# Patient Record
Sex: MALE | Race: WHITE | NOT HISPANIC OR LATINO | Employment: FULL TIME | URBAN - METROPOLITAN AREA
[De-identification: names, ages, dates, MRNs, and addresses within clinical notes are randomized per-mention and may not be internally consistent; named-entity substitution may affect disease eponyms.]

---

## 2017-01-10 ENCOUNTER — OFFICE VISIT (OUTPATIENT)
Dept: SPORTS MEDICINE | Facility: CLINIC | Age: 57
End: 2017-01-10

## 2017-01-10 VITALS
WEIGHT: 183 LBS | DIASTOLIC BLOOD PRESSURE: 90 MMHG | HEIGHT: 68 IN | TEMPERATURE: 98.5 F | BODY MASS INDEX: 27.74 KG/M2 | SYSTOLIC BLOOD PRESSURE: 130 MMHG | OXYGEN SATURATION: 97 % | HEART RATE: 68 BPM

## 2017-01-10 DIAGNOSIS — Z00.00 PREVENTATIVE HEALTH CARE: Primary | ICD-10-CM

## 2017-01-10 DIAGNOSIS — Z11.59 NEED FOR HEPATITIS C SCREENING TEST: ICD-10-CM

## 2017-01-10 DIAGNOSIS — I10 ESSENTIAL HYPERTENSION: ICD-10-CM

## 2017-01-10 PROCEDURE — 99396 PREV VISIT EST AGE 40-64: CPT | Performed by: FAMILY MEDICINE

## 2017-01-10 RX ORDER — IRBESARTAN 150 MG/1
150 TABLET ORAL DAILY
Qty: 90 TABLET | Refills: 3 | Status: SHIPPED | OUTPATIENT
Start: 2017-01-10 | End: 2018-01-10 | Stop reason: SDUPTHER

## 2017-01-10 RX ORDER — VARDENAFIL HCL 10 MG
TABLET,DISINTEGRATING ORAL
Qty: 10 TABLET | Refills: 1 | Status: SHIPPED | OUTPATIENT
Start: 2017-01-10 | End: 2019-02-20 | Stop reason: ALTCHOICE

## 2017-01-10 NOTE — MR AVS SNAPSHOT
Tacho Armendariz   1/10/2017 8:00 AM   Office Visit    Dept Phone:  [phone]   Encounter #:  [id number]    Provider:  Bigg Phillips MD   Department:  [facility]                Your Full Care Plan              Today's Medication Changes          These changes are accurate as of: 1/10/17  9:38 AM.  If you have any questions, ask your nurse or doctor.               New Medication(s)Ordered:     irbesartan 150 MG tablet   Commonly known as:  AVAPRO   Take 1 tablet by mouth Daily.   Started by:  Bigg Phillips MD            Where to Get Your Medications      These medications were sent to [place] [id number] [place] [address]  - [phone] [facility] [phone] [address] [address]     Phone:  [phone]     irbesartan 150 MG tablet                  Your Updated Medication List          This list is accurate as of: 1/10/17  9:38 AM.  Always use your most recent med list.                irbesartan 150 MG tablet   Commonly known as:  AVAPRO   Take 1 tablet by mouth Daily.       STAXYN 10 MG tablet dispersible   Generic drug:  Vardenafil HCl       sulfamethoxazole-trimethoprim 800-160 MG per tablet   Commonly known as:  BACTRIM DS,SEPTRA DS   One tablet po BID until completed for infection               We Performed the Following     CBC & Differential     Comprehensive Metabolic Panel     Hepatitis C Antibody     Lipid Panel With / Chol / HDL Ratio     PSA     T4, Free     TSH     UA / M With / Rflx Culture([facility] ONLY)       You Were Diagnosed With        Codes Comments    Preventative health care    -  Primary ICD-10-CM: Z00.00  ICD-9-CM: V70.0     Essential hypertension     ICD-10-CM: I10  ICD-9-CM: 401.9     Need for hepatitis C screening test     ICD-10-CM: Z11.59  ICD-9-CM: V73.89       Instructions     None    Patient Instructions History      Upcoming Appointments     Visit Type  "Date Time Department    PHYSICAL 1/10/2017  8:00 AM [facility]      "[employer] Signup     [facility] "[employer] allows you to send messages to your doctor, view your test results, renew your prescriptions, schedule appointments, and more. To sign up, go to [employer] and click on the Sign Up Now link in the New User? box. Enter your "[employer] Activation Code exactly as it appears below along with the last four digits of your Social Security Number and your Date of Birth () to complete the sign-up process. If you do not sign up before the expiration date, you must request a new code.    "[employer] Activation Code: [email or web address]  Expires: 2017  5:34 AM    If you have questions, you can email [employer] [email or web address] or call [phone] to talk to our "[employer] staff. Remember, "[employer] is NOT to be used for urgent needs. For medical emergencies, dial 911.               Other Info from Your Visit           Allergies     No Known Allergies      Reason for Visit     Annual Exam           Vital Signs     Blood Pressure Pulse Temperature Height Weight Oxygen Saturation    130/90 (BP Location: Right arm, Patient Position: Sitting, Cuff Size: Adult) 68 98.5 °F (36.9 °C) (Oral) 68\" (172.7 cm) 183 lb (83 kg) 97%    Body Mass Index Smoking Status                27.83 kg/m2 Never Smoker          Problems and Diagnoses Noted     High blood pressure    Preventative health care    -  Primary    Need for hepatitis C screening test            "

## 2017-01-10 NOTE — PROGRESS NOTES
"Subjective   Tacho Armendariz is a 56 y.o. male.   Chief Complaint   Patient presents with   • Annual Exam       History of Present Illness   1.  CPE  2.  Plans cscope this summer  3.  Had not refilled BP meds due to \"not sure I needed it\", has been out for over a month    I have reviewed the patient's medical history in detail and updated the computerized patient record.        Review of Systems   Constitutional: Negative for activity change, appetite change, chills, diaphoresis, fatigue, fever and unexpected weight change.   HENT: Negative for congestion, ear pain, postnasal drip, rhinorrhea, sinus pressure, sneezing, sore throat and trouble swallowing.    Eyes: Negative for visual disturbance.   Respiratory: Negative for cough, chest tightness, shortness of breath and wheezing.    Cardiovascular: Negative for chest pain and palpitations.   Gastrointestinal: Negative for abdominal pain, blood in stool, nausea and vomiting.   Endocrine: Negative for cold intolerance, polydipsia, polyphagia and polyuria.   Genitourinary: Negative for dysuria, flank pain, frequency, hematuria and urgency.   Musculoskeletal: Negative for arthralgias, back pain, joint swelling and myalgias.   Skin: Negative for rash.   Allergic/Immunologic: Negative for environmental allergies.   Neurological: Negative for dizziness, syncope, weakness, numbness and headaches.   Hematological: Negative for adenopathy. Does not bruise/bleed easily.   Psychiatric/Behavioral: Negative for agitation, decreased concentration, dysphoric mood, sleep disturbance and suicidal ideas. The patient is not nervous/anxious.      Visit Vitals   • /90 (BP Location: Right arm, Patient Position: Sitting, Cuff Size: Adult)   • Pulse 68   • Temp 98.5 °F (36.9 °C) (Oral)   • Ht 68\" (172.7 cm)   • Wt 183 lb (83 kg)   • SpO2 97%   • BMI 27.83 kg/m2       Objective   Physical Exam   Constitutional: He is oriented to person, place, and time. He appears well-developed and " well-nourished.   HENT:   Head: Normocephalic and atraumatic.   Right Ear: External ear normal.   Left Ear: External ear normal.   Nose: Nose normal.   Mouth/Throat: Oropharynx is clear and moist.   Eyes: Conjunctivae and EOM are normal. Pupils are equal, round, and reactive to light.   Neck: Normal range of motion. Neck supple. No thyromegaly present.   Cardiovascular: Normal rate, regular rhythm and normal heart sounds.    No peripheral edema   Pulmonary/Chest: Effort normal and breath sounds normal.   Abdominal: Soft. Bowel sounds are normal.   Neurological: He is alert and oriented to person, place, and time.   Skin: Skin is warm, dry and intact.   Psychiatric: He has a normal mood and affect. His behavior is normal. Thought content normal.   Vitals reviewed.      Assessment/Plan   Tacho was seen today for annual exam.    Diagnoses and all orders for this visit:    Preventative health care  -     CBC & Differential  -     Comprehensive Metabolic Panel  -     Lipid Panel With / Chol / HDL Ratio  -     PSA  -     T4, Free  -     TSH  -     UA / M With / Rflx Culture([facility] ONLY)    Essential hypertension  -     irbesartan (AVAPRO) 150 MG tablet; Take 1 tablet by mouth Daily.    Need for hepatitis C screening test  -     Hepatitis C Antibody          Restart Avapro, FU 1 month  Patient will make own appt for cscope

## 2017-01-11 LAB
ALBUMIN SERPL-MCNC: 4.6 G/DL (ref 3.5–5.2)
ALBUMIN/GLOB SERPL: 1.7 G/DL
ALP SERPL-CCNC: 54 U/L (ref 39–117)
ALT SERPL-CCNC: 21 U/L (ref 1–41)
APPEARANCE UR: CLEAR
AST SERPL-CCNC: 19 U/L (ref 1–40)
BACTERIA #/AREA URNS HPF: ABNORMAL /HPF
BASOPHILS # BLD AUTO: 0.04 10*3/MM3 (ref 0–0.2)
BASOPHILS NFR BLD AUTO: 0.6 % (ref 0–1.5)
BILIRUB SERPL-MCNC: 0.5 MG/DL (ref 0.1–1.2)
BILIRUB UR QL STRIP: NEGATIVE
BUN SERPL-MCNC: 15 MG/DL (ref 6–20)
BUN/CREAT SERPL: 13.5 (ref 7–25)
CALCIUM SERPL-MCNC: 9.7 MG/DL (ref 8.6–10.5)
CASTS URNS MICRO: ABNORMAL
CASTS URNS QL MICRO: PRESENT /LPF
CHLORIDE SERPL-SCNC: 103 MMOL/L (ref 98–107)
CHOLEST SERPL-MCNC: 201 MG/DL (ref 0–200)
CHOLEST/HDLC SERPL: 3.47 {RATIO}
CO2 SERPL-SCNC: 27.4 MMOL/L (ref 22–29)
COLOR UR: YELLOW
CREAT SERPL-MCNC: 1.11 MG/DL (ref 0.76–1.27)
CRYSTALS URNS MICRO: ABNORMAL
EOSINOPHIL # BLD AUTO: 0.2 10*3/MM3 (ref 0–0.7)
EOSINOPHIL NFR BLD AUTO: 2.9 % (ref 0.3–6.2)
EPI CELLS #/AREA URNS HPF: ABNORMAL /HPF
ERYTHROCYTE [DISTWIDTH] IN BLOOD BY AUTOMATED COUNT: 13.2 % (ref 11.5–14.5)
GLOBULIN SER CALC-MCNC: 2.7 GM/DL
GLUCOSE SERPL-MCNC: 88 MG/DL (ref 65–99)
GLUCOSE UR QL: NEGATIVE
HCT VFR BLD AUTO: 47.6 % (ref 40.4–52.2)
HCV AB S/CO SERPL IA: 0.1 S/CO RATIO (ref 0–0.9)
HDLC SERPL-MCNC: 58 MG/DL (ref 40–60)
HGB BLD-MCNC: 16 G/DL (ref 13.7–17.6)
HGB UR QL STRIP: NEGATIVE
IMM GRANULOCYTES # BLD: 0.02 10*3/MM3 (ref 0–0.03)
IMM GRANULOCYTES NFR BLD: 0.3 % (ref 0–0.5)
KETONES UR QL STRIP: NEGATIVE
LDLC SERPL CALC-MCNC: 121 MG/DL (ref 0–100)
LEUKOCYTE ESTERASE UR QL STRIP: NEGATIVE
LYMPHOCYTES # BLD AUTO: 2.32 10*3/MM3 (ref 0.9–4.8)
LYMPHOCYTES NFR BLD AUTO: 33.5 % (ref 19.6–45.3)
MCH RBC QN AUTO: 32.3 PG (ref 27–32.7)
MCHC RBC AUTO-ENTMCNC: 33.6 G/DL (ref 32.6–36.4)
MCV RBC AUTO: 96 FL (ref 79.8–96.2)
MICRO URNS: NORMAL
MICRO URNS: NORMAL
MONOCYTES # BLD AUTO: 0.54 10*3/MM3 (ref 0.2–1.2)
MONOCYTES NFR BLD AUTO: 7.8 % (ref 5–12)
MUCOUS THREADS URNS QL MICRO: PRESENT /HPF
NEUTROPHILS # BLD AUTO: 3.81 10*3/MM3 (ref 1.9–8.1)
NEUTROPHILS NFR BLD AUTO: 54.9 % (ref 42.7–76)
NITRITE UR QL STRIP: NEGATIVE
PH UR STRIP: 5.5 [PH] (ref 5–7.5)
PLATELET # BLD AUTO: 267 10*3/MM3 (ref 140–500)
POTASSIUM SERPL-SCNC: 4.5 MMOL/L (ref 3.5–5.2)
PROT SERPL-MCNC: 7.3 G/DL (ref 6–8.5)
PROT UR QL STRIP: NEGATIVE
PSA SERPL-MCNC: 3.73 NG/ML (ref 0–4)
RBC # BLD AUTO: 4.96 10*6/MM3 (ref 4.6–6)
RBC #/AREA URNS HPF: ABNORMAL /HPF
SODIUM SERPL-SCNC: 142 MMOL/L (ref 136–145)
SP GR UR: 1.02 (ref 1–1.03)
T4 FREE SERPL-MCNC: 1.38 NG/DL (ref 0.93–1.7)
TRIGL SERPL-MCNC: 112 MG/DL (ref 0–150)
TSH SERPL DL<=0.005 MIU/L-ACNC: 4.86 MIU/ML (ref 0.27–4.2)
UNIDENT CRYS URNS QL MICRO: PRESENT /LPF
URINALYSIS REFLEX: NORMAL
UROBILINOGEN UR STRIP-MCNC: 0.2 MG/DL (ref 0.2–1)
VLDLC SERPL CALC-MCNC: 22.4 MG/DL (ref 5–40)
WBC # BLD AUTO: 6.93 10*3/MM3 (ref 4.5–10.7)
WBC #/AREA URNS HPF: ABNORMAL /HPF

## 2018-01-10 DIAGNOSIS — I10 ESSENTIAL HYPERTENSION: ICD-10-CM

## 2018-01-10 RX ORDER — IRBESARTAN 150 MG/1
150 TABLET ORAL DAILY
Qty: 90 TABLET | Refills: 0 | Status: SHIPPED | OUTPATIENT
Start: 2018-01-10 | End: 2018-02-19 | Stop reason: SDUPTHER

## 2018-02-12 ENCOUNTER — LAB (OUTPATIENT)
Dept: SPORTS MEDICINE | Facility: CLINIC | Age: 58
End: 2018-02-12

## 2018-02-12 DIAGNOSIS — Z00.00 PREVENTATIVE HEALTH CARE: ICD-10-CM

## 2018-02-12 DIAGNOSIS — Z00.00 PREVENTATIVE HEALTH CARE: Primary | ICD-10-CM

## 2018-02-13 LAB
ABO GROUP BLD: NORMAL
ALBUMIN SERPL-MCNC: 4.6 G/DL (ref 3.5–5.2)
ALBUMIN/GLOB SERPL: 1.5 G/DL
ALP SERPL-CCNC: 53 U/L (ref 39–117)
ALT SERPL-CCNC: 25 U/L (ref 1–41)
APPEARANCE UR: ABNORMAL
AST SERPL-CCNC: 49 U/L (ref 1–40)
BACTERIA #/AREA URNS HPF: ABNORMAL /HPF
BASOPHILS # BLD AUTO: 0.03 10*3/MM3 (ref 0–0.2)
BASOPHILS NFR BLD AUTO: 0.4 % (ref 0–1.5)
BILIRUB SERPL-MCNC: 0.6 MG/DL (ref 0.1–1.2)
BILIRUB UR QL STRIP: NEGATIVE
BUN SERPL-MCNC: 17 MG/DL (ref 6–20)
BUN/CREAT SERPL: 15.6 (ref 7–25)
CALCIUM SERPL-MCNC: 9.6 MG/DL (ref 8.6–10.5)
CHLORIDE SERPL-SCNC: 101 MMOL/L (ref 98–107)
CHOLEST SERPL-MCNC: 226 MG/DL (ref 0–200)
CHOLEST/HDLC SERPL: 3.48 {RATIO}
CO2 SERPL-SCNC: 27.6 MMOL/L (ref 22–29)
COLOR UR: YELLOW
CREAT SERPL-MCNC: 1.09 MG/DL (ref 0.76–1.27)
CRYSTALS URNS MICRO: ABNORMAL
EOSINOPHIL # BLD AUTO: 0.12 10*3/MM3 (ref 0–0.7)
EOSINOPHIL NFR BLD AUTO: 1.7 % (ref 0.3–6.2)
EPI CELLS #/AREA URNS HPF: ABNORMAL /HPF
ERYTHROCYTE [DISTWIDTH] IN BLOOD BY AUTOMATED COUNT: 13.2 % (ref 11.5–14.5)
GFR SERPLBLD CREATININE-BSD FMLA CKD-EPI: 70 ML/MIN/1.73
GFR SERPLBLD CREATININE-BSD FMLA CKD-EPI: 85 ML/MIN/1.73
GLOBULIN SER CALC-MCNC: 3 GM/DL
GLUCOSE SERPL-MCNC: 95 MG/DL (ref 65–99)
GLUCOSE UR QL: NEGATIVE
HCT VFR BLD AUTO: 46.9 % (ref 40.4–52.2)
HDLC SERPL-MCNC: 65 MG/DL (ref 40–60)
HGB BLD-MCNC: 15.6 G/DL (ref 13.7–17.6)
HGB UR QL STRIP: NEGATIVE
IMM GRANULOCYTES # BLD: 0 10*3/MM3 (ref 0–0.03)
IMM GRANULOCYTES NFR BLD: 0 % (ref 0–0.5)
KETONES UR QL STRIP: NEGATIVE
LDLC SERPL CALC-MCNC: 145 MG/DL (ref 0–100)
LEUKOCYTE ESTERASE UR QL STRIP: NEGATIVE
LYMPHOCYTES # BLD AUTO: 2.03 10*3/MM3 (ref 0.9–4.8)
LYMPHOCYTES NFR BLD AUTO: 28.9 % (ref 19.6–45.3)
MCH RBC QN AUTO: 32.7 PG (ref 27–32.7)
MCHC RBC AUTO-ENTMCNC: 33.3 G/DL (ref 32.6–36.4)
MCV RBC AUTO: 98.3 FL (ref 79.8–96.2)
MICRO URNS: ABNORMAL
MICRO URNS: ABNORMAL
MONOCYTES # BLD AUTO: 0.61 10*3/MM3 (ref 0.2–1.2)
MONOCYTES NFR BLD AUTO: 8.7 % (ref 5–12)
MUCOUS THREADS URNS QL MICRO: PRESENT /HPF
NEUTROPHILS # BLD AUTO: 4.23 10*3/MM3 (ref 1.9–8.1)
NEUTROPHILS NFR BLD AUTO: 60.3 % (ref 42.7–76)
NITRITE UR QL STRIP: NEGATIVE
PH UR STRIP: 5 [PH] (ref 5–7.5)
PLATELET # BLD AUTO: 302 10*3/MM3 (ref 140–500)
POTASSIUM SERPL-SCNC: 4.4 MMOL/L (ref 3.5–5.2)
PROT SERPL-MCNC: 7.6 G/DL (ref 6–8.5)
PROT UR QL STRIP: NEGATIVE
PSA SERPL-MCNC: 3.99 NG/ML (ref 0–4)
RBC # BLD AUTO: 4.77 10*6/MM3 (ref 4.6–6)
RBC #/AREA URNS HPF: ABNORMAL /HPF
RH BLD: NEGATIVE
SODIUM SERPL-SCNC: 141 MMOL/L (ref 136–145)
SP GR UR: 1.03 (ref 1–1.03)
T4 FREE SERPL-MCNC: 1.44 NG/DL (ref 0.93–1.7)
TRIGL SERPL-MCNC: 79 MG/DL (ref 0–150)
TSH SERPL DL<=0.005 MIU/L-ACNC: 3.36 MIU/ML (ref 0.27–4.2)
UNIDENT CRYS URNS QL MICRO: PRESENT /LPF
URINALYSIS REFLEX: ABNORMAL
UROBILINOGEN UR STRIP-MCNC: 0.2 MG/DL (ref 0.2–1)
VLDLC SERPL CALC-MCNC: 15.8 MG/DL (ref 5–40)
WBC # BLD AUTO: 7.02 10*3/MM3 (ref 4.5–10.7)
WBC #/AREA URNS HPF: ABNORMAL /HPF

## 2018-02-19 ENCOUNTER — OFFICE VISIT (OUTPATIENT)
Dept: SPORTS MEDICINE | Facility: CLINIC | Age: 58
End: 2018-02-19

## 2018-02-19 VITALS
WEIGHT: 183 LBS | HEIGHT: 68 IN | BODY MASS INDEX: 27.74 KG/M2 | SYSTOLIC BLOOD PRESSURE: 110 MMHG | OXYGEN SATURATION: 98 % | DIASTOLIC BLOOD PRESSURE: 74 MMHG | TEMPERATURE: 98.5 F | HEART RATE: 85 BPM

## 2018-02-19 DIAGNOSIS — I10 ESSENTIAL HYPERTENSION: ICD-10-CM

## 2018-02-19 DIAGNOSIS — Z12.11 SCREEN FOR COLON CANCER: ICD-10-CM

## 2018-02-19 DIAGNOSIS — Z00.00 PREVENTATIVE HEALTH CARE: Primary | ICD-10-CM

## 2018-02-19 PROCEDURE — 99396 PREV VISIT EST AGE 40-64: CPT | Performed by: FAMILY MEDICINE

## 2018-02-19 RX ORDER — IRBESARTAN 150 MG/1
150 TABLET ORAL DAILY
Qty: 90 TABLET | Refills: 3
Start: 2018-02-19 | End: 2019-02-12 | Stop reason: SDUPTHER

## 2018-02-19 NOTE — PROGRESS NOTES
"Tacho Armendariz is here today for an annual physical exam.     Eating a healthy diet. Exercising routinely.     I have reviewed the patient's medical, family, and social history in detail and updated the computerized patient record.    Screening history:  Colonoscopy - due  Prostate - last year  Metabolic - last year    Health Maintenance   Topic Date Due   • COLONOSCOPY  04/30/2017   • INFLUENZA VACCINE  08/01/2017   • TDAP/TD VACCINES (2 - Td) 06/25/2025   • HEPATITIS C SCREENING  Completed       Review of Systems    /74  Pulse 85  Temp 98.5 °F (36.9 °C)  Ht 172.7 cm (68\")  Wt 83 kg (183 lb)  SpO2 98%  BMI 27.83 kg/m2     Physical Exam    Vital signs reviewed.  General appearance: No acute distress  Eyes: conjunctiva clear without erythema; pupils equally round and reactive  ENT: external ears and nose normal; hearing normal, oropharynx clear  Neck: supple; no thyromegaly  CV: normal rate and rhythm; no peripheral edema  Respiratory: normal respiratory effort; lungs clear to auscultation bilaterally  MSK: normal gait and station; no focal joint deformity or swelling  Skin: no rash or wounds; normal turgor  Neuro: cranial nerves 2-12 grossly intact; normal sensation to light touch  Psych: mood and affect normal; recent and remote memory intact    Lab on 02/12/2018   Component Date Value Ref Range Status   • ABO Type 02/12/2018 A   Final   • Rh Factor 02/12/2018 Negative   Final    Comment: Please note: Prior records for this patient's ABO / Rh type are not  available for additional verification.     • WBC 02/12/2018 7.02  4.50 - 10.70 10*3/mm3 Final   • RBC 02/12/2018 4.77  4.60 - 6.00 10*6/mm3 Final   • Hemoglobin 02/12/2018 15.6  13.7 - 17.6 g/dL Final   • Hematocrit 02/12/2018 46.9  40.4 - 52.2 % Final   • MCV 02/12/2018 98.3* 79.8 - 96.2 fL Final   • MCH 02/12/2018 32.7  27.0 - 32.7 pg Final   • MCHC 02/12/2018 33.3  32.6 - 36.4 g/dL Final   • RDW 02/12/2018 13.2  11.5 - 14.5 % Final   • Platelets " 02/12/2018 302  140 - 500 10*3/mm3 Final   • Neutrophil Rel % 02/12/2018 60.3  42.7 - 76.0 % Final   • Lymphocyte Rel % 02/12/2018 28.9  19.6 - 45.3 % Final   • Monocyte Rel % 02/12/2018 8.7  5.0 - 12.0 % Final   • Eosinophil Rel % 02/12/2018 1.7  0.3 - 6.2 % Final   • Basophil Rel % 02/12/2018 0.4  0.0 - 1.5 % Final   • Neutrophils Absolute 02/12/2018 4.23  1.90 - 8.10 10*3/mm3 Final   • Lymphocytes Absolute 02/12/2018 2.03  0.90 - 4.80 10*3/mm3 Final   • Monocytes Absolute 02/12/2018 0.61  0.20 - 1.20 10*3/mm3 Final   • Eosinophils Absolute 02/12/2018 0.12  0.00 - 0.70 10*3/mm3 Final   • Basophils Absolute 02/12/2018 0.03  0.00 - 0.20 10*3/mm3 Final   • Immature Granulocyte Rel % 02/12/2018 0.0  0.0 - 0.5 % Final   • Immature Grans Absolute 02/12/2018 0.00  0.00 - 0.03 10*3/mm3 Final   • Glucose 02/12/2018 95  65 - 99 mg/dL Final   • BUN 02/12/2018 17  6 - 20 mg/dL Final   • Creatinine 02/12/2018 1.09  0.76 - 1.27 mg/dL Final   • eGFR Non  Am 02/12/2018 70  >60 mL/min/1.73 Final   • eGFR African Am 02/12/2018 85  >60 mL/min/1.73 Final   • BUN/Creatinine Ratio 02/12/2018 15.6  7.0 - 25.0 Final   • Sodium 02/12/2018 141  136 - 145 mmol/L Final   • Potassium 02/12/2018 4.4  3.5 - 5.2 mmol/L Final   • Chloride 02/12/2018 101  98 - 107 mmol/L Final   • Total CO2 02/12/2018 27.6  22.0 - 29.0 mmol/L Final   • Calcium 02/12/2018 9.6  8.6 - 10.5 mg/dL Final   • Total Protein 02/12/2018 7.6  6.0 - 8.5 g/dL Final   • Albumin 02/12/2018 4.60  3.50 - 5.20 g/dL Final   • Globulin 02/12/2018 3.0  gm/dL Final   • A/G Ratio 02/12/2018 1.5  g/dL Final   • Total Bilirubin 02/12/2018 0.6  0.1 - 1.2 mg/dL Final   • Alkaline Phosphatase 02/12/2018 53  39 - 117 U/L Final   • AST (SGOT) 02/12/2018 49* 1 - 40 U/L Final   • ALT (SGPT) 02/12/2018 25  1 - 41 U/L Final   • Total Cholesterol 02/12/2018 226* 0 - 200 mg/dL Final   • Triglycerides 02/12/2018 79  0 - 150 mg/dL Final   • HDL Cholesterol 02/12/2018 65* 40 - 60 mg/dL Final   •  VLDL Cholesterol 02/12/2018 15.8  5 - 40 mg/dL Final   • LDL Cholesterol  02/12/2018 145* 0 - 100 mg/dL Final   • Chol/HDL Ratio 02/12/2018 3.48   Final   • PSA 02/12/2018 3.990  0.000 - 4.000 ng/mL Final   • TSH 02/12/2018 3.360  0.270 - 4.200 mIU/mL Final   • Free T4 02/12/2018 1.44  0.93 - 1.70 ng/dL Final   • Specific Gravity, UA 02/12/2018 1.028  1.005 - 1.030 Final   • pH, UA 02/12/2018 5.0  5.0 - 7.5 Final   • Color, UA 02/12/2018 Yellow  Yellow Final   • Appearance, UA 02/12/2018 Turbid* Clear Final   • Leukocytes, UA 02/12/2018 Negative  Negative Final   • Protein 02/12/2018 Negative  Negative/Trace Final   • Glucose, UA 02/12/2018 Negative  Negative Final   • Ketones 02/12/2018 Negative  Negative Final   • Blood, UA 02/12/2018 Negative  Negative Final   • Bilirubin, UA 02/12/2018 Negative  Negative Final   • Urobilinogen, UA 02/12/2018 0.2  0.2 - 1.0 mg/dL Final   • Nitrite, UA 02/12/2018 Negative  Negative Final   • Microscopic Examination 02/12/2018 Comment   Final    Microscopic follows if indicated.   • MICROSCOPIC EXAMINATION 02/12/2018 See below:   Final    Microscopic was indicated and was performed.   • Urinalysis Reflex 02/12/2018 Comment   Final    This specimen will not reflex to a Urine Culture.   • WBC, UA 02/12/2018 0-5  0 - 5 /hpf Final   • RBC, UA 02/12/2018 None seen  0 - 2 /hpf Final   • Epithelial Cells (non renal) 02/12/2018 None seen  0 - 10 /hpf Final   • Crystals, UA 02/12/2018 Present* N/A /lpf Final   • Crystal Type 02/12/2018 Calcium Oxalate  N/A Final   • Mucus, UA 02/12/2018 Present  Not Estab. /hpf Final   • Bacteria, UA 02/12/2018 None seen  None seen/Few /hpf Final        Tod was seen today for annual exam.    Diagnoses and all orders for this visit:    Preventative health care    Screen for colon cancer  -     Ambulatory Referral to Gastroenterology    Essential hypertension  -     irbesartan (AVAPRO) 150 MG tablet; Take 1 tablet by mouth Daily.      If he is able to lose  another 10 pounds and is having orthostasic sxs then would hold BP meds and FU with me 2 weeks later      Encourage healthy diet and exercise.  Encourage patient to stay up to date on screening examinations as indicated based on age and risk factors.    EMR Dragon/Transcription disclaimer:    Much of this encounter note is an electronic transcription/translation of spoken language to printed text.  The electronic translation of spoken language may permit erroneous, or at times, nonsensical words or phrases to be inadvertently transcribed.  Although I have reviewed the note for such errors some may still exist.

## 2018-04-19 DIAGNOSIS — I10 ESSENTIAL HYPERTENSION: ICD-10-CM

## 2018-04-19 RX ORDER — IRBESARTAN 150 MG/1
150 TABLET ORAL DAILY
Qty: 90 TABLET | Refills: 0 | Status: SHIPPED | OUTPATIENT
Start: 2018-04-19 | End: 2018-08-12 | Stop reason: SDUPTHER

## 2018-08-12 DIAGNOSIS — I10 ESSENTIAL HYPERTENSION: ICD-10-CM

## 2018-08-13 RX ORDER — IRBESARTAN 150 MG/1
150 TABLET ORAL DAILY
Qty: 90 TABLET | Refills: 0 | Status: SHIPPED | OUTPATIENT
Start: 2018-08-13 | End: 2018-11-10 | Stop reason: SDUPTHER

## 2018-11-10 DIAGNOSIS — I10 ESSENTIAL HYPERTENSION: ICD-10-CM

## 2018-11-12 RX ORDER — IRBESARTAN 150 MG/1
150 TABLET ORAL DAILY
Qty: 90 TABLET | Refills: 0 | Status: SHIPPED | OUTPATIENT
Start: 2018-11-12 | End: 2019-02-16 | Stop reason: SDUPTHER

## 2019-02-12 DIAGNOSIS — I10 ESSENTIAL HYPERTENSION: ICD-10-CM

## 2019-02-12 RX ORDER — IRBESARTAN 150 MG/1
150 TABLET ORAL DAILY
Qty: 90 TABLET | Refills: 3
Start: 2019-02-12 | End: 2019-08-14 | Stop reason: SDUPTHER

## 2019-02-13 ENCOUNTER — LAB (OUTPATIENT)
Dept: SPORTS MEDICINE | Facility: CLINIC | Age: 59
End: 2019-02-13

## 2019-02-13 DIAGNOSIS — I10 ESSENTIAL HYPERTENSION: ICD-10-CM

## 2019-02-13 DIAGNOSIS — Z00.00 ENCOUNTER FOR ANNUAL PHYSICAL EXAM: ICD-10-CM

## 2019-02-13 DIAGNOSIS — Z13.220 SCREENING CHOLESTEROL LEVEL: ICD-10-CM

## 2019-02-13 DIAGNOSIS — R97.20 ELEVATED PROSTATE SPECIFIC ANTIGEN (PSA): Primary | ICD-10-CM

## 2019-02-13 DIAGNOSIS — Z13.29 SCREENING FOR THYROID DISORDER: ICD-10-CM

## 2019-02-13 RX ORDER — IRBESARTAN 150 MG/1
150 TABLET ORAL DAILY
Qty: 90 TABLET | Refills: 0 | OUTPATIENT
Start: 2019-02-13

## 2019-02-14 LAB
ALBUMIN SERPL-MCNC: 4.5 G/DL (ref 3.5–5.5)
ALBUMIN/GLOB SERPL: 1.7 {RATIO} (ref 1.2–2.2)
ALP SERPL-CCNC: 54 IU/L (ref 39–117)
ALT SERPL-CCNC: 30 IU/L (ref 0–44)
APPEARANCE UR: CLEAR
AST SERPL-CCNC: 23 IU/L (ref 0–40)
BACTERIA #/AREA URNS HPF: NORMAL /[HPF]
BASOPHILS # BLD AUTO: 0 X10E3/UL (ref 0–0.2)
BASOPHILS NFR BLD AUTO: 1 %
BILIRUB SERPL-MCNC: 0.7 MG/DL (ref 0–1.2)
BILIRUB UR QL STRIP: NEGATIVE
BUN SERPL-MCNC: 20 MG/DL (ref 6–24)
BUN/CREAT SERPL: 19 (ref 9–20)
CALCIUM SERPL-MCNC: 9.6 MG/DL (ref 8.7–10.2)
CHLORIDE SERPL-SCNC: 104 MMOL/L (ref 96–106)
CHOLEST SERPL-MCNC: 205 MG/DL (ref 100–199)
CHOLEST/HDLC SERPL: 4 RATIO (ref 0–5)
CO2 SERPL-SCNC: 26 MMOL/L (ref 20–29)
COLOR UR: YELLOW
CREAT SERPL-MCNC: 1.07 MG/DL (ref 0.76–1.27)
EOSINOPHIL # BLD AUTO: 0.1 X10E3/UL (ref 0–0.4)
EOSINOPHIL NFR BLD AUTO: 2 %
EPI CELLS #/AREA URNS HPF: NORMAL /HPF
ERYTHROCYTE [DISTWIDTH] IN BLOOD BY AUTOMATED COUNT: 13.7 % (ref 12.3–15.4)
GLOBULIN SER CALC-MCNC: 2.7 G/DL (ref 1.5–4.5)
GLUCOSE SERPL-MCNC: 95 MG/DL (ref 65–99)
GLUCOSE UR QL: NEGATIVE
HCT VFR BLD AUTO: 45.5 % (ref 37.5–51)
HDLC SERPL-MCNC: 51 MG/DL
HGB BLD-MCNC: 15.2 G/DL (ref 13–17.7)
HGB UR QL STRIP: NEGATIVE
IMM GRANULOCYTES # BLD AUTO: 0 X10E3/UL (ref 0–0.1)
IMM GRANULOCYTES NFR BLD AUTO: 0 %
KETONES UR QL STRIP: NEGATIVE
LDLC SERPL CALC-MCNC: 126 MG/DL (ref 0–99)
LEUKOCYTE ESTERASE UR QL STRIP: NEGATIVE
LYMPHOCYTES # BLD AUTO: 1.8 X10E3/UL (ref 0.7–3.1)
LYMPHOCYTES NFR BLD AUTO: 27 %
MCH RBC QN AUTO: 31.8 PG (ref 26.6–33)
MCHC RBC AUTO-ENTMCNC: 33.4 G/DL (ref 31.5–35.7)
MCV RBC AUTO: 95 FL (ref 79–97)
MICRO URNS: NORMAL
MICRO URNS: NORMAL
MONOCYTES # BLD AUTO: 0.6 X10E3/UL (ref 0.1–0.9)
MONOCYTES NFR BLD AUTO: 9 %
MUCOUS THREADS URNS QL MICRO: PRESENT
NEUTROPHILS # BLD AUTO: 4 X10E3/UL (ref 1.4–7)
NEUTROPHILS NFR BLD AUTO: 61 %
NITRITE UR QL STRIP: NEGATIVE
PH UR STRIP: 7.5 [PH] (ref 5–7.5)
PLATELET # BLD AUTO: 300 X10E3/UL (ref 150–379)
POTASSIUM SERPL-SCNC: 4.6 MMOL/L (ref 3.5–5.2)
PROT SERPL-MCNC: 7.2 G/DL (ref 6–8.5)
PROT UR QL STRIP: NEGATIVE
PSA SERPL-MCNC: 4.6 NG/ML (ref 0–4)
RBC # BLD AUTO: 4.78 X10E6/UL (ref 4.14–5.8)
RBC #/AREA URNS HPF: NORMAL /HPF
SODIUM SERPL-SCNC: 142 MMOL/L (ref 134–144)
SP GR UR: 1.03 (ref 1–1.03)
T4 FREE SERPL-MCNC: 1.22 NG/DL (ref 0.82–1.77)
TRIGL SERPL-MCNC: 142 MG/DL (ref 0–149)
TSH SERPL DL<=0.005 MIU/L-ACNC: 3.2 UIU/ML (ref 0.45–4.5)
URINALYSIS REFLEX: NORMAL
UROBILINOGEN UR STRIP-MCNC: 0.2 MG/DL (ref 0.2–1)
VLDLC SERPL CALC-MCNC: 28 MG/DL (ref 5–40)
WBC # BLD AUTO: 6.6 X10E3/UL (ref 3.4–10.8)
WBC #/AREA URNS HPF: NORMAL /HPF

## 2019-02-16 DIAGNOSIS — I10 ESSENTIAL HYPERTENSION: ICD-10-CM

## 2019-02-18 ENCOUNTER — TELEPHONE (OUTPATIENT)
Dept: SPORTS MEDICINE | Facility: CLINIC | Age: 59
End: 2019-02-18

## 2019-02-18 RX ORDER — IRBESARTAN 150 MG/1
150 TABLET ORAL DAILY
Qty: 90 TABLET | Refills: 0 | Status: SHIPPED | OUTPATIENT
Start: 2019-02-18 | End: 2019-02-20

## 2019-02-18 NOTE — TELEPHONE ENCOUNTER
PT calling saying pharmacy said there is an issue with blood pressure medication please call pharm and discuss  Medication is   irbesartan (AVAPRO) 150 MG tablet

## 2019-02-20 ENCOUNTER — OFFICE VISIT (OUTPATIENT)
Dept: SPORTS MEDICINE | Facility: CLINIC | Age: 59
End: 2019-02-20

## 2019-02-20 VITALS
WEIGHT: 190 LBS | OXYGEN SATURATION: 98 % | BODY MASS INDEX: 28.79 KG/M2 | HEART RATE: 83 BPM | DIASTOLIC BLOOD PRESSURE: 62 MMHG | TEMPERATURE: 97.9 F | HEIGHT: 68 IN | SYSTOLIC BLOOD PRESSURE: 118 MMHG

## 2019-02-20 DIAGNOSIS — R97.20 ELEVATED PROSTATE SPECIFIC ANTIGEN (PSA): ICD-10-CM

## 2019-02-20 DIAGNOSIS — F52.21 ERECTILE DYSFUNCTION OF NONORGANIC ORIGIN: ICD-10-CM

## 2019-02-20 DIAGNOSIS — Z00.00 PREVENTATIVE HEALTH CARE: Primary | ICD-10-CM

## 2019-02-20 PROCEDURE — 99396 PREV VISIT EST AGE 40-64: CPT | Performed by: FAMILY MEDICINE

## 2019-02-20 RX ORDER — SODIUM PHOSPHATE,MONO-DIBASIC 19G-7G/118
ENEMA (ML) RECTAL DAILY
COMMUNITY

## 2019-02-20 NOTE — PROGRESS NOTES
Tacho Herediafer is here today for an annual physical exam.     Eating a healthy diet. Exercising routinely.     ED still an issue, would like try Stendra.     PHQ-2 Depression Screening  Little interest or pleasure in doing things? 0   Feeling down, depressed, or hopeless? 0   PHQ-2 Total Score 0        I have reviewed the patient's medical, family, and social history in detail and updated the computerized patient record.    Screening history:  Colonoscopy - plans to have done this year, already has the paper work for chetan  Prostate - last year  Metabolic - last year    Health Maintenance   Topic Date Due   • ZOSTER VACCINE (1 of 2) 11/29/2012   • COLONOSCOPY  04/30/2017   • INFLUENZA VACCINE  08/01/2018   • ANNUAL PHYSICAL  02/20/2019   • TDAP/TD VACCINES (2 - Td) 06/25/2025   • HEPATITIS C SCREENING  Completed       Review of Systems   Constitutional: Negative for activity change, appetite change, chills, diaphoresis, fatigue, fever and unexpected weight change.   HENT: Negative for congestion, ear pain, postnasal drip, rhinorrhea, sinus pressure, sneezing, sore throat and trouble swallowing.    Eyes: Negative for visual disturbance.   Respiratory: Negative for cough, chest tightness, shortness of breath and wheezing.    Cardiovascular: Negative for chest pain and palpitations.   Gastrointestinal: Negative for abdominal pain, blood in stool, nausea and vomiting.   Endocrine: Negative for cold intolerance, polydipsia, polyphagia and polyuria.   Genitourinary: Negative for dysuria, flank pain, frequency, hematuria and urgency.        Per HPI   Musculoskeletal: Negative for arthralgias, back pain, joint swelling and myalgias.   Skin: Negative for rash.   Allergic/Immunologic: Negative for environmental allergies.   Neurological: Negative for dizziness, syncope, weakness, numbness and headaches.   Hematological: Negative for adenopathy. Does not bruise/bleed easily.   Psychiatric/Behavioral: Negative for agitation,  "decreased concentration, dysphoric mood, sleep disturbance and suicidal ideas. The patient is not nervous/anxious.        /62   Pulse 83   Temp 97.9 °F (36.6 °C)   Ht 172.7 cm (68\")   Wt 86.2 kg (190 lb)   SpO2 98%   BMI 28.89 kg/m²      Physical Exam    Vital signs reviewed.  General appearance: No acute distress  Eyes: conjunctiva clear without erythema; pupils equally round and reactive  ENT: external ears and nose normal; hearing normal, oropharynx clear  Neck: supple; no thyromegaly  CV: normal rate and rhythm; no peripheral edema  Respiratory: normal respiratory effort; lungs clear to auscultation bilaterally  MSK: normal gait and station; no focal joint deformity or swelling  Skin: no rash or wounds; normal turgor  Neuro: cranial nerves 2-12 grossly intact; normal sensation to light touch  Psych: mood and affect normal; recent and remote memory intact    Lab on 02/13/2019   Component Date Value Ref Range Status   • WBC 02/13/2019 6.6  3.4 - 10.8 x10E3/uL Final   • RBC 02/13/2019 4.78  4.14 - 5.80 x10E6/uL Final   • Hemoglobin 02/13/2019 15.2  13.0 - 17.7 g/dL Final   • Hematocrit 02/13/2019 45.5  37.5 - 51.0 % Final   • MCV 02/13/2019 95  79 - 97 fL Final   • MCH 02/13/2019 31.8  26.6 - 33.0 pg Final   • MCHC 02/13/2019 33.4  31.5 - 35.7 g/dL Final   • RDW 02/13/2019 13.7  12.3 - 15.4 % Final   • Platelets 02/13/2019 300  150 - 379 x10E3/uL Final   • Neutrophil Rel % 02/13/2019 61  Not Estab. % Final   • Lymphocyte Rel % 02/13/2019 27  Not Estab. % Final   • Monocyte Rel % 02/13/2019 9  Not Estab. % Final   • Eosinophil Rel % 02/13/2019 2  Not Estab. % Final   • Basophil Rel % 02/13/2019 1  Not Estab. % Final   • Neutrophils Absolute 02/13/2019 4.0  1.4 - 7.0 x10E3/uL Final   • Lymphocytes Absolute 02/13/2019 1.8  0.7 - 3.1 x10E3/uL Final   • Monocytes Absolute 02/13/2019 0.6  0.1 - 0.9 x10E3/uL Final   • Eosinophils Absolute 02/13/2019 0.1  0.0 - 0.4 x10E3/uL Final   • Basophils Absolute " 02/13/2019 0.0  0.0 - 0.2 x10E3/uL Final   • Immature Granulocyte Rel % 02/13/2019 0  Not Estab. % Final   • Immature Grans Absolute 02/13/2019 0.0  0.0 - 0.1 x10E3/uL Final   • Glucose 02/13/2019 95  65 - 99 mg/dL Final   • BUN 02/13/2019 20  6 - 24 mg/dL Final   • Creatinine 02/13/2019 1.07  0.76 - 1.27 mg/dL Final   • eGFR Non  Am 02/13/2019 77  >59 mL/min/1.73 Final   • eGFR African Am 02/13/2019 89  >59 mL/min/1.73 Final   • BUN/Creatinine Ratio 02/13/2019 19  9 - 20 Final   • Sodium 02/13/2019 142  134 - 144 mmol/L Final   • Potassium 02/13/2019 4.6  3.5 - 5.2 mmol/L Final   • Chloride 02/13/2019 104  96 - 106 mmol/L Final   • Total CO2 02/13/2019 26  20 - 29 mmol/L Final   • Calcium 02/13/2019 9.6  8.7 - 10.2 mg/dL Final   • Total Protein 02/13/2019 7.2  6.0 - 8.5 g/dL Final   • Albumin 02/13/2019 4.5  3.5 - 5.5 g/dL Final   • Globulin 02/13/2019 2.7  1.5 - 4.5 g/dL Final   • A/G Ratio 02/13/2019 1.7  1.2 - 2.2 Final   • Total Bilirubin 02/13/2019 0.7  0.0 - 1.2 mg/dL Final   • Alkaline Phosphatase 02/13/2019 54  39 - 117 IU/L Final   • AST (SGOT) 02/13/2019 23  0 - 40 IU/L Final   • ALT (SGPT) 02/13/2019 30  0 - 44 IU/L Final   • Total Cholesterol 02/13/2019 205* 100 - 199 mg/dL Final   • Triglycerides 02/13/2019 142  0 - 149 mg/dL Final   • HDL Cholesterol 02/13/2019 51  >39 mg/dL Final   • VLDL Cholesterol 02/13/2019 28  5 - 40 mg/dL Final   • LDL Cholesterol  02/13/2019 126* 0 - 99 mg/dL Final   • Chol/HDL Ratio 02/13/2019 4.0  0.0 - 5.0 ratio Final    Comment:                                   T. Chol/HDL Ratio                                              Men  Women                                1/2 Avg.Risk  3.4    3.3                                    Avg.Risk  5.0    4.4                                 2X Avg.Risk  9.6    7.1                                 3X Avg.Risk 23.4   11.0     • PSA 02/13/2019 4.6* 0.0 - 4.0 ng/mL Final    Comment: Roche ECLIA methodology.  According to the American  Urological Association, Serum PSA should  decrease and remain at undetectable levels after radical  prostatectomy. The AUA defines biochemical recurrence as an initial  PSA value 0.2 ng/mL or greater followed by a subsequent confirmatory  PSA value 0.2 ng/mL or greater.  Values obtained with different assay methods or kits cannot be used  interchangeably. Results cannot be interpreted as absolute evidence  of the presence or absence of malignant disease.     • TSH 02/13/2019 3.200  0.450 - 4.500 uIU/mL Final   • Free T4 02/13/2019 1.22  0.82 - 1.77 ng/dL Final   • Specific Gravity, UA 02/13/2019 1.028  1.005 - 1.030 Final   • pH, UA 02/13/2019 7.5  5.0 - 7.5 Final   • Color, UA 02/13/2019 Yellow  Yellow Final   • Appearance, UA 02/13/2019 Clear  Clear Final   • Leukocytes, UA 02/13/2019 Negative  Negative Final   • Protein 02/13/2019 Negative  Negative/Trace Final   • Glucose, UA 02/13/2019 Negative  Negative Final   • Ketones 02/13/2019 Negative  Negative Final   • Blood, UA 02/13/2019 Negative  Negative Final   • Bilirubin, UA 02/13/2019 Negative  Negative Final   • Urobilinogen, UA 02/13/2019 0.2  0.2 - 1.0 mg/dL Final   • Nitrite, UA 02/13/2019 Negative  Negative Final   • Microscopic Examination 02/13/2019 Comment   Final    Microscopic follows if indicated.   • Microscopic Examination 02/13/2019 See below:   Final    Microscopic was indicated and was performed.   • Urinalysis Reflex 02/13/2019 Comment   Final    This specimen will not reflex to a Urine Culture.   • WBC, UA 02/13/2019 0-5  0 - 5 /hpf Final   • RBC, UA 02/13/2019 0-2  0 - 2 /hpf Final   • Epithelial Cells (non renal) 02/13/2019 None seen  0 - 10 /hpf Final   • Mucus, UA 02/13/2019 Present  Not Estab. Final   • Bacteria, UA 02/13/2019 None seen  None seen/Few Final          Current Outpatient Medications:   •  glucosamine-chondroitin 500-400 MG capsule capsule, Take  by mouth Daily., Disp: , Rfl:   •  irbesartan (AVAPRO) 150 MG tablet, Take 1  tablet by mouth Daily., Disp: 90 tablet, Rfl: 3  •  Avanafil (STENDRA) 200 MG tablet, 1 tablet one hour before need, Disp: 10 tablet, Rfl: 11  •  fluocinonide (LIDEX) 0.05 % cream, Apply to affected area BID, Disp: 30 g, Rfl: 0  •  hydrOXYzine (ATARAX) 10 MG tablet, 1-3 tabs PO Q6H prn itching, Disp: 40 tablet, Rfl: 0    Tod was seen today for annual exam.    Diagnoses and all orders for this visit:    Preventative health care    Elevated prostate specific antigen (PSA)  -     Ambulatory Referral to Urology    Erectile dysfunction of nonorganic origin  -     Avanafil (STENDRA) 200 MG tablet; 1 tablet one hour before need  -     Ambulatory Referral to Urology        Encourage healthy diet and exercise.  Encourage patient to stay up to date on screening examinations as indicated based on age and risk factors.    EMR Dragon/Transcription disclaimer:    Much of this encounter note is an electronic transcription/translation of spoken language to printed text.  The electronic translation of spoken language may permit erroneous, or at times, nonsensical words or phrases to be inadvertently transcribed.  Although I have reviewed the note for such errors some may still exist.

## 2019-05-11 DIAGNOSIS — I10 ESSENTIAL HYPERTENSION: ICD-10-CM

## 2019-05-13 RX ORDER — IRBESARTAN 150 MG/1
150 TABLET ORAL DAILY
Qty: 90 TABLET | Refills: 0 | OUTPATIENT
Start: 2019-05-13

## 2019-05-15 DIAGNOSIS — I10 ESSENTIAL HYPERTENSION: ICD-10-CM

## 2019-05-15 RX ORDER — IRBESARTAN 150 MG/1
150 TABLET ORAL DAILY
Qty: 90 TABLET | Refills: 0 | Status: SHIPPED | OUTPATIENT
Start: 2019-05-15 | End: 2019-08-14 | Stop reason: SDUPTHER

## 2019-08-14 DIAGNOSIS — I10 ESSENTIAL HYPERTENSION: ICD-10-CM

## 2019-08-14 RX ORDER — IRBESARTAN 150 MG/1
150 TABLET ORAL DAILY
Qty: 90 TABLET | Refills: 0 | Status: SHIPPED | OUTPATIENT
Start: 2019-08-14 | End: 2019-11-11 | Stop reason: SDUPTHER

## 2019-11-11 DIAGNOSIS — I10 ESSENTIAL HYPERTENSION: ICD-10-CM

## 2019-11-11 RX ORDER — IRBESARTAN 150 MG/1
150 TABLET ORAL DAILY
Qty: 90 TABLET | Refills: 0 | Status: SHIPPED | OUTPATIENT
Start: 2019-11-11 | End: 2020-02-04

## 2020-02-04 DIAGNOSIS — I10 ESSENTIAL HYPERTENSION: ICD-10-CM

## 2020-02-04 RX ORDER — IRBESARTAN 150 MG/1
150 TABLET ORAL DAILY
Qty: 90 TABLET | Refills: 0 | Status: SHIPPED | OUTPATIENT
Start: 2020-02-04 | End: 2020-03-18 | Stop reason: SDUPTHER

## 2020-03-11 ENCOUNTER — LAB (OUTPATIENT)
Dept: SPORTS MEDICINE | Facility: CLINIC | Age: 60
End: 2020-03-11

## 2020-03-11 DIAGNOSIS — Z13.29 SCREENING FOR THYROID DISORDER: ICD-10-CM

## 2020-03-11 DIAGNOSIS — I10 ESSENTIAL HYPERTENSION: Primary | ICD-10-CM

## 2020-03-11 DIAGNOSIS — Z13.220 SCREENING CHOLESTEROL LEVEL: ICD-10-CM

## 2020-03-11 DIAGNOSIS — Z00.00 PREVENTATIVE HEALTH CARE: ICD-10-CM

## 2020-03-11 DIAGNOSIS — Z00.00 ENCOUNTER FOR ANNUAL PHYSICAL EXAM: ICD-10-CM

## 2020-03-11 DIAGNOSIS — R97.20 ELEVATED PROSTATE SPECIFIC ANTIGEN (PSA): ICD-10-CM

## 2020-03-12 LAB
ALBUMIN SERPL-MCNC: 4.3 G/DL (ref 3.5–5.2)
ALBUMIN/GLOB SERPL: 1.5 G/DL
ALP SERPL-CCNC: 55 U/L (ref 39–117)
ALT SERPL-CCNC: 24 U/L (ref 1–41)
APPEARANCE UR: CLEAR
AST SERPL-CCNC: 17 U/L (ref 1–40)
BACTERIA #/AREA URNS HPF: NORMAL /HPF
BASOPHILS # BLD AUTO: 0.04 10*3/MM3 (ref 0–0.2)
BASOPHILS NFR BLD AUTO: 0.6 % (ref 0–1.5)
BILIRUB SERPL-MCNC: 0.5 MG/DL (ref 0.2–1.2)
BILIRUB UR QL STRIP: NEGATIVE
BUN SERPL-MCNC: 18 MG/DL (ref 6–20)
BUN/CREAT SERPL: 17.5 (ref 7–25)
CALCIUM SERPL-MCNC: 9.2 MG/DL (ref 8.6–10.5)
CHLORIDE SERPL-SCNC: 104 MMOL/L (ref 98–107)
CHOLEST SERPL-MCNC: 196 MG/DL (ref 0–200)
CO2 SERPL-SCNC: 25.6 MMOL/L (ref 22–29)
COLOR UR: YELLOW
CREAT SERPL-MCNC: 1.03 MG/DL (ref 0.76–1.27)
EOSINOPHIL # BLD AUTO: 0.21 10*3/MM3 (ref 0–0.4)
EOSINOPHIL NFR BLD AUTO: 3.3 % (ref 0.3–6.2)
EPI CELLS #/AREA URNS HPF: NORMAL /HPF (ref 0–10)
ERYTHROCYTE [DISTWIDTH] IN BLOOD BY AUTOMATED COUNT: 12.7 % (ref 12.3–15.4)
GLOBULIN SER CALC-MCNC: 2.8 GM/DL
GLUCOSE SERPL-MCNC: 100 MG/DL (ref 65–99)
GLUCOSE UR QL: NEGATIVE
HBA1C MFR BLD: 5.3 % (ref 4.8–5.6)
HCT VFR BLD AUTO: 44.2 % (ref 37.5–51)
HDLC SERPL-MCNC: 54 MG/DL (ref 40–60)
HGB BLD-MCNC: 15.5 G/DL (ref 13–17.7)
HGB UR QL STRIP: NEGATIVE
IMM GRANULOCYTES # BLD AUTO: 0.02 10*3/MM3 (ref 0–0.05)
IMM GRANULOCYTES NFR BLD AUTO: 0.3 % (ref 0–0.5)
KETONES UR QL STRIP: NEGATIVE
LDLC SERPL CALC-MCNC: 120 MG/DL (ref 0–100)
LDLC/HDLC SERPL: 2.23 {RATIO}
LEUKOCYTE ESTERASE UR QL STRIP: NEGATIVE
LYMPHOCYTES # BLD AUTO: 1.93 10*3/MM3 (ref 0.7–3.1)
LYMPHOCYTES NFR BLD AUTO: 30.6 % (ref 19.6–45.3)
MCH RBC QN AUTO: 32.9 PG (ref 26.6–33)
MCHC RBC AUTO-ENTMCNC: 35.1 G/DL (ref 31.5–35.7)
MCV RBC AUTO: 93.8 FL (ref 79–97)
MICRO URNS: NORMAL
MICRO URNS: NORMAL
MONOCYTES # BLD AUTO: 0.66 10*3/MM3 (ref 0.1–0.9)
MONOCYTES NFR BLD AUTO: 10.5 % (ref 5–12)
MUCOUS THREADS URNS QL MICRO: PRESENT /HPF
NEUTROPHILS # BLD AUTO: 3.44 10*3/MM3 (ref 1.7–7)
NEUTROPHILS NFR BLD AUTO: 54.7 % (ref 42.7–76)
NITRITE UR QL STRIP: NEGATIVE
NRBC BLD AUTO-RTO: 0 /100 WBC (ref 0–0.2)
PH UR STRIP: 5.5 [PH] (ref 5–7.5)
PLATELET # BLD AUTO: 309 10*3/MM3 (ref 140–450)
POTASSIUM SERPL-SCNC: 4.5 MMOL/L (ref 3.5–5.2)
PROT SERPL-MCNC: 7.1 G/DL (ref 6–8.5)
PROT UR QL STRIP: NEGATIVE
PSA SERPL-MCNC: 3.89 NG/ML (ref 0–4)
RBC # BLD AUTO: 4.71 10*6/MM3 (ref 4.14–5.8)
RBC #/AREA URNS HPF: NORMAL /HPF (ref 0–2)
SODIUM SERPL-SCNC: 140 MMOL/L (ref 136–145)
SP GR UR: 1.03 (ref 1–1.03)
T4 FREE SERPL-MCNC: 1.35 NG/DL (ref 0.93–1.7)
TRIGL SERPL-MCNC: 109 MG/DL (ref 0–150)
TSH SERPL DL<=0.005 MIU/L-ACNC: 4.34 UIU/ML (ref 0.27–4.2)
URINALYSIS REFLEX: NORMAL
UROBILINOGEN UR STRIP-MCNC: 0.2 MG/DL (ref 0.2–1)
VLDLC SERPL CALC-MCNC: 21.8 MG/DL
WBC # BLD AUTO: 6.3 10*3/MM3 (ref 3.4–10.8)
WBC #/AREA URNS HPF: NORMAL /HPF (ref 0–5)

## 2020-03-18 ENCOUNTER — OFFICE VISIT (OUTPATIENT)
Dept: SPORTS MEDICINE | Facility: CLINIC | Age: 60
End: 2020-03-18

## 2020-03-18 VITALS
DIASTOLIC BLOOD PRESSURE: 80 MMHG | WEIGHT: 190 LBS | SYSTOLIC BLOOD PRESSURE: 122 MMHG | OXYGEN SATURATION: 99 % | BODY MASS INDEX: 28.79 KG/M2 | HEART RATE: 71 BPM | HEIGHT: 68 IN | TEMPERATURE: 98.1 F

## 2020-03-18 DIAGNOSIS — Z00.00 PREVENTATIVE HEALTH CARE: Primary | ICD-10-CM

## 2020-03-18 DIAGNOSIS — Z12.11 SCREEN FOR COLON CANCER: ICD-10-CM

## 2020-03-18 DIAGNOSIS — I10 ESSENTIAL HYPERTENSION: ICD-10-CM

## 2020-03-18 PROCEDURE — 99396 PREV VISIT EST AGE 40-64: CPT | Performed by: FAMILY MEDICINE

## 2020-03-18 RX ORDER — IRBESARTAN 150 MG/1
150 TABLET ORAL DAILY
Qty: 90 TABLET | Refills: 3 | Status: SHIPPED | OUTPATIENT
Start: 2020-03-18 | End: 2021-04-19

## 2020-03-18 NOTE — PROGRESS NOTES
"Tacho Armendariz is here today for an annual physical exam.     Eating a healthy diet. Exercising routinely.     PHQ-2 Depression Screening  Little interest or pleasure in doing things? 0   Feeling down, depressed, or hopeless? 0   PHQ-2 Total Score 0        I have reviewed the patient's medical, family, and social history in detail and updated the computerized patient record.    Screening history:  Colonoscopy - due  Prostate - 2019  Metabolic - last year    Health Maintenance   Topic Date Due   • ZOSTER VACCINE (1 of 2) 11/29/2012   • COLONOSCOPY  04/30/2017   • ANNUAL PHYSICAL  02/21/2020   • TDAP/TD VACCINES (2 - Td) 06/25/2025   • HEPATITIS C SCREENING  Completed   • INFLUENZA VACCINE  Addressed       Review of Systems    /80   Pulse 71   Temp 98.1 °F (36.7 °C)   Ht 172.7 cm (67.99\")   Wt 86.2 kg (190 lb)   SpO2 99%   BMI 28.90 kg/m²      Physical Exam    Vital signs reviewed.  General appearance: No acute distress  Eyes: conjunctiva clear without erythema; pupils equally round and reactive  ENT: external ears and nose normal; hearing normal, oropharynx clear  Neck: supple; no thyromegaly  CV: normal rate and rhythm; no peripheral edema  Respiratory: normal respiratory effort; lungs clear to auscultation bilaterally  MSK: normal gait and station; no focal joint deformity or swelling  Skin: no rash or wounds; normal turgor  Neuro: cranial nerves 2-12 grossly intact; normal sensation to light touch  Psych: mood and affect normal; recent and remote memory intact    Lab on 03/11/2020   Component Date Value Ref Range Status   • Free T4 03/11/2020 1.35  0.93 - 1.70 ng/dL Final    Results may be falsely increased if patient taking Biotin.   • Total Cholesterol 03/11/2020 196  0 - 200 mg/dL Final   • Triglycerides 03/11/2020 109  0 - 150 mg/dL Final   • HDL Cholesterol 03/11/2020 54  40 - 60 mg/dL Final   • VLDL Cholesterol 03/11/2020 21.8  mg/dL Final   • LDL Cholesterol  03/11/2020 120* 0 - 100 mg/dL Final "   • LDL/HDL Ratio 03/11/2020 2.23   Final   • WBC 03/11/2020 6.30  3.40 - 10.80 10*3/mm3 Final   • RBC 03/11/2020 4.71  4.14 - 5.80 10*6/mm3 Final   • Hemoglobin 03/11/2020 15.5  13.0 - 17.7 g/dL Final   • Hematocrit 03/11/2020 44.2  37.5 - 51.0 % Final   • MCV 03/11/2020 93.8  79.0 - 97.0 fL Final   • MCH 03/11/2020 32.9  26.6 - 33.0 pg Final   • MCHC 03/11/2020 35.1  31.5 - 35.7 g/dL Final   • RDW 03/11/2020 12.7  12.3 - 15.4 % Final   • Platelets 03/11/2020 309  140 - 450 10*3/mm3 Final   • Neutrophil Rel % 03/11/2020 54.7  42.7 - 76.0 % Final   • Lymphocyte Rel % 03/11/2020 30.6  19.6 - 45.3 % Final   • Monocyte Rel % 03/11/2020 10.5  5.0 - 12.0 % Final   • Eosinophil Rel % 03/11/2020 3.3  0.3 - 6.2 % Final   • Basophil Rel % 03/11/2020 0.6  0.0 - 1.5 % Final   • Neutrophils Absolute 03/11/2020 3.44  1.70 - 7.00 10*3/mm3 Final   • Lymphocytes Absolute 03/11/2020 1.93  0.70 - 3.10 10*3/mm3 Final   • Monocytes Absolute 03/11/2020 0.66  0.10 - 0.90 10*3/mm3 Final   • Eosinophils Absolute 03/11/2020 0.21  0.00 - 0.40 10*3/mm3 Final   • Basophils Absolute 03/11/2020 0.04  0.00 - 0.20 10*3/mm3 Final   • Immature Granulocyte Rel % 03/11/2020 0.3  0.0 - 0.5 % Final   • Immature Grans Absolute 03/11/2020 0.02  0.00 - 0.05 10*3/mm3 Final   • nRBC 03/11/2020 0.0  0.0 - 0.2 /100 WBC Final   • Glucose 03/11/2020 100* 65 - 99 mg/dL Final   • BUN 03/11/2020 18  6 - 20 mg/dL Final   • Creatinine 03/11/2020 1.03  0.76 - 1.27 mg/dL Final   • eGFR Non African Am 03/11/2020 74  >60 mL/min/1.73 Final   • eGFR African Am 03/11/2020 90  >60 mL/min/1.73 Final   • BUN/Creatinine Ratio 03/11/2020 17.5  7.0 - 25.0 Final   • Sodium 03/11/2020 140  136 - 145 mmol/L Final   • Potassium 03/11/2020 4.5  3.5 - 5.2 mmol/L Final   • Chloride 03/11/2020 104  98 - 107 mmol/L Final   • Total CO2 03/11/2020 25.6  22.0 - 29.0 mmol/L Final   • Calcium 03/11/2020 9.2  8.6 - 10.5 mg/dL Final   • Total Protein 03/11/2020 7.1  6.0 - 8.5 g/dL Final   •  Albumin 03/11/2020 4.30  3.50 - 5.20 g/dL Final   • Globulin 03/11/2020 2.8  gm/dL Final   • A/G Ratio 03/11/2020 1.5  g/dL Final   • Total Bilirubin 03/11/2020 0.5  0.2 - 1.2 mg/dL Final   • Alkaline Phosphatase 03/11/2020 55  39 - 117 U/L Final   • AST (SGOT) 03/11/2020 17  1 - 40 U/L Final   • ALT (SGPT) 03/11/2020 24  1 - 41 U/L Final   • TSH 03/11/2020 4.340* 0.270 - 4.200 uIU/mL Final   • Hemoglobin A1C 03/11/2020 5.30  4.80 - 5.60 % Final    Comment: Hemoglobin A1C Ranges:  Increased Risk for Diabetes  5.7% to 6.4%  Diabetes                     >= 6.5%  Diabetic Goal                < 7.0%     • PSA 03/11/2020 3.890  0.000 - 4.000 ng/mL Final    Results may be falsely decreased if patient taking Biotin.   • Specific Gravity, UA 03/11/2020 1.027  1.005 - 1.030 Final   • pH, UA 03/11/2020 5.5  5.0 - 7.5 Final   • Color, UA 03/11/2020 Yellow  Yellow Final   • Appearance, UA 03/11/2020 Clear  Clear Final   • Leukocytes, UA 03/11/2020 Negative  Negative Final   • Protein 03/11/2020 Negative  Negative/Trace Final   • Glucose, UA 03/11/2020 Negative  Negative Final   • Ketones 03/11/2020 Negative  Negative Final   • Blood, UA 03/11/2020 Negative  Negative Final   • Bilirubin, UA 03/11/2020 Negative  Negative Final   • Urobilinogen, UA 03/11/2020 0.2  0.2 - 1.0 mg/dL Final   • Nitrite, UA 03/11/2020 Negative  Negative Final   • Microscopic Examination 03/11/2020 Comment   Final    Microscopic follows if indicated.   • Microscopic Examination 03/11/2020 See below:   Final    Microscopic was indicated and was performed.   • Urinalysis Reflex 03/11/2020 Comment   Final    This specimen will not reflex to a Urine Culture.   • WBC, UA 03/11/2020 0-5  0 - 5 /hpf Final   • RBC, UA 03/11/2020 None seen  0 - 2 /hpf Final   • Epithelial Cells (non renal) 03/11/2020 0-10  0 - 10 /hpf Final   • Mucus, UA 03/11/2020 Present  Not Estab. /hpf Final   • Bacteria, UA 03/11/2020 Few  None seen/Few /hpf Final          Current Outpatient  Medications:   •  Avanafil (STENDRA) 200 MG tablet, 1 tablet one hour before need, Disp: 10 tablet, Rfl: 11  •  glucosamine-chondroitin 500-400 MG capsule capsule, Take  by mouth Daily., Disp: , Rfl:   •  irbesartan (AVAPRO) 150 MG tablet, Take 1 tablet by mouth Daily., Disp: 90 tablet, Rfl: 3    Tacho was seen today for annual exam.    Diagnoses and all orders for this visit:    Preventative health care    Screen for colon cancer  -     Ambulatory Referral to Gastroenterology    Essential hypertension  -     irbesartan (AVAPRO) 150 MG tablet; Take 1 tablet by mouth Daily.      D/w patient about CT calcium scoring, he will consider and let me know (to decide on whether he needs statin or not)      Shingrix d/w with patient.       Encourage healthy diet and exercise.  Encourage patient to stay up to date on screening examinations as indicated based on age and risk factors.    EMR Dragon/Transcription disclaimer:    Much of this encounter note is an electronic transcription/translation of spoken language to printed text.  The electronic translation of spoken language may permit erroneous, or at times, nonsensical words or phrases to be inadvertently transcribed.  Although I have reviewed the note for such errors some may still exist.

## 2020-07-02 ENCOUNTER — PREP FOR SURGERY (OUTPATIENT)
Dept: GASTROENTEROLOGY | Facility: CLINIC | Age: 60
End: 2020-07-02

## 2020-07-02 DIAGNOSIS — Z12.11 ENCOUNTER FOR SCREENING FOR MALIGNANT NEOPLASM OF COLON: Primary | ICD-10-CM

## 2020-09-15 ENCOUNTER — LAB REQUISITION (OUTPATIENT)
Dept: LAB | Facility: HOSPITAL | Age: 60
End: 2020-09-15

## 2020-09-15 DIAGNOSIS — Z00.00 ENCOUNTER FOR GENERAL ADULT MEDICAL EXAMINATION WITHOUT ABNORMAL FINDINGS: ICD-10-CM

## 2020-12-14 ENCOUNTER — LAB REQUISITION (OUTPATIENT)
Dept: LAB | Facility: HOSPITAL | Age: 60
End: 2020-12-14

## 2020-12-14 DIAGNOSIS — Z00.00 ENCOUNTER FOR GENERAL ADULT MEDICAL EXAMINATION WITHOUT ABNORMAL FINDINGS: ICD-10-CM

## 2020-12-15 PROCEDURE — U0004 COV-19 TEST NON-CDC HGH THRU: HCPCS | Performed by: INTERNAL MEDICINE

## 2020-12-16 LAB — SARS-COV-2 RNA RESP QL NAA+PROBE: NOT DETECTED

## 2020-12-17 ENCOUNTER — OUTSIDE FACILITY SERVICE (OUTPATIENT)
Dept: GASTROENTEROLOGY | Facility: CLINIC | Age: 60
End: 2020-12-17

## 2020-12-17 PROCEDURE — 45378 DIAGNOSTIC COLONOSCOPY: CPT | Performed by: INTERNAL MEDICINE

## 2021-04-18 DIAGNOSIS — I10 ESSENTIAL HYPERTENSION: ICD-10-CM

## 2021-04-19 RX ORDER — IRBESARTAN 150 MG/1
TABLET ORAL
Qty: 90 TABLET | Refills: 3 | Status: SHIPPED | OUTPATIENT
Start: 2021-04-19 | End: 2022-05-12

## 2022-05-10 DIAGNOSIS — I10 ESSENTIAL HYPERTENSION: ICD-10-CM

## 2022-05-12 RX ORDER — IRBESARTAN 150 MG/1
TABLET ORAL
Qty: 30 TABLET | Refills: 0 | Status: SHIPPED | OUTPATIENT
Start: 2022-05-12 | End: 2022-07-15 | Stop reason: SDUPTHER

## 2022-06-07 DIAGNOSIS — I10 ESSENTIAL HYPERTENSION: ICD-10-CM

## 2022-06-07 RX ORDER — IRBESARTAN 150 MG/1
TABLET ORAL
Qty: 30 TABLET | Refills: 0 | OUTPATIENT
Start: 2022-06-07

## 2022-07-15 RX ORDER — IRBESARTAN 150 MG/1
150 TABLET ORAL DAILY
Qty: 90 TABLET | Refills: 0 | Status: SHIPPED | OUTPATIENT
Start: 2022-07-15 | End: 2022-08-25 | Stop reason: SDUPTHER

## 2022-08-25 ENCOUNTER — OFFICE VISIT (OUTPATIENT)
Dept: FAMILY MEDICINE CLINIC | Facility: CLINIC | Age: 62
End: 2022-08-25

## 2022-08-25 VITALS
SYSTOLIC BLOOD PRESSURE: 141 MMHG | HEART RATE: 71 BPM | DIASTOLIC BLOOD PRESSURE: 93 MMHG | TEMPERATURE: 96.4 F | WEIGHT: 184.2 LBS | OXYGEN SATURATION: 98 % | BODY MASS INDEX: 27.92 KG/M2 | HEIGHT: 68 IN

## 2022-08-25 DIAGNOSIS — I10 ESSENTIAL HYPERTENSION: ICD-10-CM

## 2022-08-25 DIAGNOSIS — Z12.5 SCREENING FOR PROSTATE CANCER: ICD-10-CM

## 2022-08-25 DIAGNOSIS — Z00.00 ROUTINE PHYSICAL EXAMINATION: Primary | ICD-10-CM

## 2022-08-25 PROCEDURE — 99396 PREV VISIT EST AGE 40-64: CPT | Performed by: FAMILY MEDICINE

## 2022-08-25 PROCEDURE — 99213 OFFICE O/P EST LOW 20 MIN: CPT | Performed by: FAMILY MEDICINE

## 2022-08-25 RX ORDER — IRBESARTAN 150 MG/1
150 TABLET ORAL DAILY
Qty: 90 TABLET | Refills: 1 | Status: SHIPPED | OUTPATIENT
Start: 2022-08-25 | End: 2022-12-21

## 2022-08-25 NOTE — PROGRESS NOTES
"Chief Complaint  Establish Care (Sometimes when blowing nose, left nostril will begin to bleed (is this for reason?)) and Annual Exam    Subjective        Tacho Armendariz presents to [facility]  History of Present Illness   Tacho is a 61-year-old male who presents today for a routine physical.    Tacho is a former patient of Dr. Phillips, but he has not visited him in 2 years for a physical. He has hypertension, and had 3 inguinal hernia repairs. He has never smoked. He occasionally drinks alcoholic beverages. He has one son who is 28. He is up to date with his colonoscopy. He denies having hyperglycemia. He does not eat a vegan or vegetarian diet. He denies any urinary problems. He has always had a weak urinary stream, but he denies urinary frequency. He notes his protein in his urine has always been higher than normal, but he has received two biopsies and they have come back showing normal findings. He reports having hearing issues.      He has a family history of hypertension. His mother had breast cancer; she is still living. His father had a stroke and has hypertension, he is also still living. His siblings are healthy, he has 2 sisters.      His blood pressure today is elevated to 140/93 mmHg. He does not check his blood pressure at home often. He notes having some alcohol last night and associates it to his blood pressure being elevated. He also believed his pressure is elevated because he is in a new provider's office. He will take his blood pressure at home and take a blood pressure log. He denies any headaches, blurry vision, or chest pain.     He is due for a zoster vaccine. He is up to date on his COVID-19 vaccination.   Objective   Vital Signs:  /93   Pulse 71   Temp 96.4 °F (35.8 °C)   Ht 172.7 cm (67.99\")   Wt 83.6 kg (184 lb 3.2 oz)   SpO2 98%   BMI 28.02 kg/m²   Estimated body mass index is 28.02 kg/m² as calculated from the following:    Height as of this " "encounter: 172.7 cm (67.99\").    Weight as of this encounter: 83.6 kg (184 lb 3.2 oz).    BMI is >= 25 and <30. (Overweight) The following options were offered after discussion;: exercise counseling/recommendations and nutrition counseling/recommendations      Physical Exam  Constitutional:       General: He is not in acute distress.     Appearance: Normal appearance. He is well-developed.   HENT:      Head: Normocephalic and atraumatic.      Right Ear: Tympanic membrane normal.      Left Ear: Tympanic membrane normal.      Mouth/Throat:      Mouth: Mucous membranes are moist.   Eyes:      General:         Right eye: No discharge.         Left eye: No discharge.      Extraocular Movements: Extraocular movements intact.      Pupils: Pupils are equal, round, and reactive to light.   Cardiovascular:      Rate and Rhythm: Normal rate and regular rhythm.      Heart sounds: Normal heart sounds.   Pulmonary:      Effort: Pulmonary effort is normal.      Breath sounds: Normal breath sounds. No wheezing or rales.   Abdominal:      General: Bowel sounds are normal.      Palpations: Abdomen is soft. There is no mass.      Tenderness: There is no abdominal tenderness.   Musculoskeletal:      Cervical back: Normal range of motion and neck supple.      Right lower leg: No edema.      Left lower leg: No edema.   Lymphadenopathy:      Cervical: No cervical adenopathy.   Neurological:      General: No focal deficit present.      Mental Status: He is alert and oriented to person, place, and time.        Result Review :                Assessment and Plan   Diagnoses and all orders for this visit:    1. Routine physical examination (Primary)  -     CBC & Differential  -     Comprehensive Metabolic Panel  -     Lipid Panel  -     TSH+Free T4  Tod is a 61-year-old male who was seen today to establish care and have a routine physical. Preventive screening discussed with the patient. He is up to date with his colonoscopy.  2. Screening for " prostate cancer  -     PSA Screen  His PSA will be checked. He has a history of elevated PSA in the past, and a thinning urine stream, but he denies any urinary hesitancy or frequency. He has seen a urologist in the past, and he has had a biopsy done, but he is not on any medication.  3. Essential hypertension  -     irbesartan (AVAPRO) 150 MG tablet; Take 1 tablet by mouth Daily.  Dispense: 90 tablet; Refill: 1   A healthy heart diet was recommended to the patient. He is also seen today for hypertension. His blood pressure was elevated in the office today. His last visit with his primary care physician was 2 years ago. He denies any headache or blurry vision. His blood pressure will be rechecked. The patient was advised to return to the clinic after the labs for follow-up on blood pressure and discuss the lab results.         Follow Up   No follow-ups on file.  Patient was given instructions and counseling regarding his condition or for health maintenance advice. Please see specific information pulled into the AVS if appropriate.     Transcribed from ambient dictation for Cyndi Jo MD by Florence Capps.  08/25/22   11:23 EDT    Patient verbalized consent to the visit recording.

## 2022-08-26 LAB
ALBUMIN SERPL-MCNC: 4.5 G/DL (ref 3.8–4.9)
ALBUMIN/GLOB SERPL: 1.7 {RATIO} (ref 1.2–2.2)
ALP SERPL-CCNC: 60 IU/L (ref 44–121)
ALT SERPL-CCNC: 17 IU/L (ref 0–44)
AST SERPL-CCNC: 17 IU/L (ref 0–40)
BASOPHILS # BLD AUTO: 0 X10E3/UL (ref 0–0.2)
BASOPHILS NFR BLD AUTO: 1 %
BILIRUB SERPL-MCNC: 0.5 MG/DL (ref 0–1.2)
BUN SERPL-MCNC: 16 MG/DL (ref 6–24)
BUN/CREAT SERPL: 16 (ref 9–20)
CALCIUM SERPL-MCNC: 9.4 MG/DL (ref 8.7–10.2)
CHLORIDE SERPL-SCNC: 103 MMOL/L (ref 96–106)
CHOLEST SERPL-MCNC: 192 MG/DL (ref 100–199)
CO2 SERPL-SCNC: 23 MMOL/L (ref 20–29)
CREAT SERPL-MCNC: 1.01 MG/DL (ref 0.76–1.27)
EGFRCR-CYS SERPLBLD CKD-EPI 2021: 86 ML/MIN/1.73
EOSINOPHIL # BLD AUTO: 0.1 X10E3/UL (ref 0–0.4)
EOSINOPHIL NFR BLD AUTO: 2 %
ERYTHROCYTE [DISTWIDTH] IN BLOOD BY AUTOMATED COUNT: 13 % (ref 11.6–15.4)
GLOBULIN SER CALC-MCNC: 2.7 G/DL (ref 1.5–4.5)
GLUCOSE SERPL-MCNC: 88 MG/DL (ref 65–99)
HCT VFR BLD AUTO: 45.8 % (ref 37.5–51)
HDLC SERPL-MCNC: 59 MG/DL
HGB BLD-MCNC: 15.8 G/DL (ref 13–17.7)
IMM GRANULOCYTES # BLD AUTO: 0 X10E3/UL (ref 0–0.1)
IMM GRANULOCYTES NFR BLD AUTO: 0 %
LDLC SERPL CALC-MCNC: 117 MG/DL (ref 0–99)
LYMPHOCYTES # BLD AUTO: 1.6 X10E3/UL (ref 0.7–3.1)
LYMPHOCYTES NFR BLD AUTO: 27 %
MCH RBC QN AUTO: 32.5 PG (ref 26.6–33)
MCHC RBC AUTO-ENTMCNC: 34.5 G/DL (ref 31.5–35.7)
MCV RBC AUTO: 94 FL (ref 79–97)
MONOCYTES # BLD AUTO: 0.6 X10E3/UL (ref 0.1–0.9)
MONOCYTES NFR BLD AUTO: 9 %
NEUTROPHILS # BLD AUTO: 3.6 X10E3/UL (ref 1.4–7)
NEUTROPHILS NFR BLD AUTO: 61 %
PLATELET # BLD AUTO: 281 X10E3/UL (ref 150–450)
POTASSIUM SERPL-SCNC: 4.8 MMOL/L (ref 3.5–5.2)
PROT SERPL-MCNC: 7.2 G/DL (ref 6–8.5)
PSA SERPL-MCNC: 5.2 NG/ML (ref 0–4)
RBC # BLD AUTO: 4.86 X10E6/UL (ref 4.14–5.8)
SODIUM SERPL-SCNC: 139 MMOL/L (ref 134–144)
T4 FREE SERPL-MCNC: 1.26 NG/DL (ref 0.82–1.77)
TRIGL SERPL-MCNC: 88 MG/DL (ref 0–149)
TSH SERPL DL<=0.005 MIU/L-ACNC: 3.09 UIU/ML (ref 0.45–4.5)
VLDLC SERPL CALC-MCNC: 16 MG/DL (ref 5–40)
WBC # BLD AUTO: 5.9 X10E3/UL (ref 3.4–10.8)

## 2022-09-15 ENCOUNTER — OFFICE VISIT (OUTPATIENT)
Dept: FAMILY MEDICINE CLINIC | Facility: CLINIC | Age: 62
End: 2022-09-15

## 2022-09-15 VITALS
SYSTOLIC BLOOD PRESSURE: 118 MMHG | HEIGHT: 68 IN | TEMPERATURE: 97.1 F | BODY MASS INDEX: 28.45 KG/M2 | DIASTOLIC BLOOD PRESSURE: 72 MMHG | HEART RATE: 77 BPM | WEIGHT: 187.7 LBS | OXYGEN SATURATION: 97 %

## 2022-09-15 DIAGNOSIS — E78.2 MIXED HYPERLIPIDEMIA: ICD-10-CM

## 2022-09-15 DIAGNOSIS — I10 PRIMARY HYPERTENSION: ICD-10-CM

## 2022-09-15 DIAGNOSIS — R97.20 ELEVATED PROSTATE SPECIFIC ANTIGEN (PSA): Primary | ICD-10-CM

## 2022-09-15 PROCEDURE — 99213 OFFICE O/P EST LOW 20 MIN: CPT | Performed by: FAMILY MEDICINE

## 2022-09-15 RX ORDER — SULFAMETHOXAZOLE AND TRIMETHOPRIM 800; 160 MG/1; MG/1
1 TABLET ORAL 2 TIMES DAILY
Qty: 20 TABLET | Refills: 0 | Status: SHIPPED | OUTPATIENT
Start: 2022-09-15

## 2022-09-15 NOTE — PROGRESS NOTES
"Chief Complaint  Hypertension (3 wk f/u, lab results) and Hyperlipidemia (Elevated psa/)    Subjective        Tacho Armendariz presents to [facility]  History of Present Illness    Tacho Armendariz is a 61-year-old male who presents today for a follow-up on hypertension, hyperlipidemia and elevated PSA. He was here 3 wk's ago for physical and labs were done t that time.    The patient states that his blood pressure has improved. His blood pressure today is 118/72 mmHg. He is taking irbesartan 150 mg, and he is tolerating it well.     . He reports that he has had an elevated PSA and 2 biopsies in the past.       Objective   Vital Signs:  /72   Pulse 77   Temp 97.1 °F (36.2 °C)   Ht 172.7 cm (68\")   Wt 85.1 kg (187 lb 11.2 oz)   SpO2 97%   BMI 28.54 kg/m²   Estimated body mass index is 28.54 kg/m² as calculated from the following:    Height as of this encounter: 172.7 cm (68\").    Weight as of this encounter: 85.1 kg (187 lb 11.2 oz).          Physical Exam  Constitutional:       General: He is not in acute distress.     Appearance: Normal appearance. He is well-developed.   HENT:      Head: Normocephalic and atraumatic.      Right Ear: Tympanic membrane normal.      Left Ear: Tympanic membrane normal.      Mouth/Throat:      Mouth: Mucous membranes are moist.   Eyes:      General:         Right eye: No discharge.         Left eye: No discharge.      Extraocular Movements: Extraocular movements intact.      Pupils: Pupils are equal, round, and reactive to light.   Cardiovascular:      Rate and Rhythm: Normal rate and regular rhythm.      Heart sounds: Normal heart sounds.   Pulmonary:      Effort: Pulmonary effort is normal.      Breath sounds: Normal breath sounds. No wheezing or rales.   Abdominal:      General: Bowel sounds are normal.      Palpations: Abdomen is soft. There is no mass.      Tenderness: There is no abdominal tenderness.   Musculoskeletal:      Cervical back: Normal " range of motion and neck supple.      Right lower leg: No edema.      Left lower leg: No edema.   Lymphadenopathy:      Cervical: No cervical adenopathy.   Neurological:      General: No focal deficit present.      Mental Status: He is alert and oriented to person, place, and time.          Result Review :    Common labs    Common Labsle 8/25/22 8/25/22 8/25/22 8/25/22    1010 1010 1010 1010   Glucose  88     BUN  16     Creatinine  1.01     Sodium  139     Potassium  4.8     Chloride  103     Calcium  9.4     Total Protein  7.2     Albumin  4.5     Total Bilirubin  0.5     Alkaline Phosphatase  60     AST (SGOT)  17     ALT (SGPT)  17     WBC 5.9      Hemoglobin 15.8      Hematocrit 45.8      Platelets 281      Total Cholesterol   192    Triglycerides   88    HDL Cholesterol   59    LDL Cholesterol    117 (A)    PSA    5.2 (A)   (A) Abnormal value       Comments are available for some flowsheets but are not being displayed.                         Assessment and Plan   Diagnoses and all orders for this visit:    1. Elevated prostate specific antigen (PSA) (Primary)  - The patient has a history of an elevated PSA. He has had an elevated PSA in the past, and a biopsy x2. He denies any symptoms of enlarged prostate, but he complains of a thinning of stream. He denies any nocturia or polyuria. He will start on antibiotics for 10 days and PSA will be rechecked. If it is still elevated, we will talk about starting medication. Patient does not want to go back to urology. He will follow up in 2 weeks after repeat PSA for further management.  -     sulfamethoxazole-trimethoprim (Bactrim DS) 800-160 MG per tablet; Take 1 tablet by mouth 2 (Two) Times a Day.  Dispense: 20 tablet; Refill: 0  -     PSA DIAGNOSTIC ONLY; Future    2. Hypertension  -  His blood pressure is at goal. He will continue his current regimen.       His most recent PSA is 5.2 ng/mL  3. Hyperlipidemia  - His LDL and HDL are at goal. He will continue to  diet and exercise.   His total cholesterol was 192 mg/dL, and his HDL was 59 mg/dL, and his LDL was 117 mg/dL.          Follow Up   No follow-ups on file.    Patient was given instructions and counseling regarding his condition or for health maintenance advice. Please see specific information pulled into the AVS if appropriate.       .[email or web address]  Transcribed from ambient dictation for Cyndi Jo MD by Katie Allen.  09/15/22   12:30 EDT    Patient verbalized consent to the visit recording.

## 2022-10-11 DIAGNOSIS — I10 ESSENTIAL HYPERTENSION: ICD-10-CM

## 2022-10-11 RX ORDER — IRBESARTAN 150 MG/1
TABLET ORAL
Qty: 90 TABLET | Refills: 1 | OUTPATIENT
Start: 2022-10-11

## 2022-12-21 DIAGNOSIS — I10 ESSENTIAL HYPERTENSION: ICD-10-CM

## 2022-12-21 RX ORDER — IRBESARTAN 150 MG/1
150 TABLET ORAL DAILY
Qty: 90 TABLET | Refills: 1 | Status: SHIPPED | OUTPATIENT
Start: 2022-12-21

## 2022-12-21 NOTE — TELEPHONE ENCOUNTER
Rx Refill Note  Requested Prescriptions     Pending Prescriptions Disp Refills   • irbesartan (AVAPRO) 150 MG tablet [Pharmacy Med Name: IRBESARTAN 150MG TABLETS] 90 tablet 1     Sig: TAKE 1 TABLET BY MOUTH DAILY      Last office visit with prescribing clinician: 9/15/2022   Last telemedicine visit with prescribing clinician: Visit date not found   Next office visit with prescribing clinician: Visit date not found                         Would you like a call back once the refill request has been completed: [] Yes [] No    If the office needs to give you a call back, can they leave a voicemail: [] Yes [] No    Lincoln Gallardo MA  12/21/22, 08:05 EST     Requested to soon

## 2023-04-20 DIAGNOSIS — I10 ESSENTIAL HYPERTENSION: ICD-10-CM

## 2023-04-20 NOTE — TELEPHONE ENCOUNTER
Rx Refill Note  Requested Prescriptions     Pending Prescriptions Disp Refills   • irbesartan (AVAPRO) 150 MG tablet 90 tablet 1     Sig: Take 1 tablet by mouth Daily.      Last office visit with prescribing clinician: 9/15/2022   Last telemedicine visit with prescribing clinician: Visit date not found   Next office visit with prescribing clinician: Visit date not found                         Would you like a call back once the refill request has been completed: [] Yes [] No    If the office needs to give you a call back, can they leave a voicemail: [] Yes [] No    Lisa Rogers, PCT  04/20/23, 09:19 EDT

## 2023-04-20 NOTE — TELEPHONE ENCOUNTER
Caller: Stevie Armendarizalexey MAGANA    Relationship: Self    Best call back number: [phone]    Requested Prescriptions:   Requested Prescriptions     Pending Prescriptions Disp Refills   • irbesartan (AVAPRO) 150 MG tablet 90 tablet 1     Sig: Take 1 tablet by mouth Daily.        Pharmacy where request should be sent: [id number] [address] AT [facility] [address]  [place]  [phone] [facility] [phone] FX     Last office visit with prescribing clinician: 9/15/2022   Last telemedicine visit with prescribing clinician: Visit date not found   Next office visit with prescribing clinician: Visit date not found     Does the patient have less than a 3 day supply:  [x] Yes  [] No    Would you like a call back once the refill request has been completed: [x] Yes [] No    If the office needs to give you a call back, can they leave a voicemail: [x] Yes [] No    Ace Pretty Rep   04/20/23 09:08 EDT

## 2023-04-21 RX ORDER — IRBESARTAN 150 MG/1
150 TABLET ORAL DAILY
Qty: 90 TABLET | Refills: 1 | Status: SHIPPED | OUTPATIENT
Start: 2023-04-21

## 2023-10-17 DIAGNOSIS — I10 ESSENTIAL HYPERTENSION: ICD-10-CM

## 2023-10-18 RX ORDER — IRBESARTAN 150 MG/1
150 TABLET ORAL DAILY
Qty: 90 TABLET | Refills: 1 | Status: SHIPPED | OUTPATIENT
Start: 2023-10-18

## 2024-01-31 DIAGNOSIS — Z12.5 SCREENING FOR PROSTATE CANCER: ICD-10-CM

## 2024-01-31 DIAGNOSIS — E78.2 MIXED HYPERLIPIDEMIA: ICD-10-CM

## 2024-01-31 DIAGNOSIS — Z13.1 SCREENING FOR DIABETES MELLITUS: ICD-10-CM

## 2024-01-31 DIAGNOSIS — I10 PRIMARY HYPERTENSION: Primary | ICD-10-CM

## 2024-01-31 DIAGNOSIS — R97.20 ELEVATED PROSTATE SPECIFIC ANTIGEN (PSA): ICD-10-CM

## 2024-01-31 DIAGNOSIS — I10 ESSENTIAL HYPERTENSION: ICD-10-CM

## 2024-02-01 RX ORDER — IRBESARTAN 150 MG/1
150 TABLET ORAL DAILY
Qty: 90 TABLET | Refills: 1 | OUTPATIENT
Start: 2024-02-01

## 2024-05-03 DIAGNOSIS — I10 ESSENTIAL HYPERTENSION: ICD-10-CM

## 2024-05-03 NOTE — TELEPHONE ENCOUNTER
Caller: Tacho Armendariz    Relationship: Self    Best call back number: [phone]     Requested Prescriptions:   Requested Prescriptions     Pending Prescriptions Disp Refills    irbesartan (AVAPRO) 150 MG tablet 90 tablet 1     Sig: Take 1 tablet by mouth Daily.        Pharmacy where request should be sent: [facility] #[id number] [address]  AT [facility] [address]  [place]  [phone] [facility] [phone] FX     Last office visit with prescribing clinician: 9/15/2022   Last telemedicine visit with prescribing clinician: Visit date not found   Next office visit with prescribing clinician: Visit date not found     Additional details provided by patient: PATIENT IS WITHOUT ANY MEDICATION. PATIENT GOT EARLIEST APPOINTMENT AVAILABLE WITH [facility].  DIDN'T HAVE ANYTHING. CAN WE FILL THIS MEDICATION UNTIL THEN AT LEAST.    Does the patient have less than a 3 day supply:  [x] Yes  [] No    Would you like a call back once the refill request has been completed: [x] Yes [] No    If the office needs to give you a call back, can they leave a voicemail: [x] Yes [] No    Ace Weaver Rep   05/03/24 13:22 EDT

## 2024-05-04 RX ORDER — IRBESARTAN 150 MG/1
150 TABLET ORAL DAILY
Qty: 90 TABLET | Refills: 1 | Status: SHIPPED | OUTPATIENT
Start: 2024-05-04

## 2024-05-15 ENCOUNTER — OFFICE VISIT (OUTPATIENT)
Dept: FAMILY MEDICINE CLINIC | Facility: CLINIC | Age: 64
End: 2024-05-15
Payer: COMMERCIAL

## 2024-05-15 VITALS
TEMPERATURE: 97.5 F | BODY MASS INDEX: 28.3 KG/M2 | WEIGHT: 186.7 LBS | OXYGEN SATURATION: 99 % | HEIGHT: 68 IN | HEART RATE: 79 BPM | RESPIRATION RATE: 16 BRPM | DIASTOLIC BLOOD PRESSURE: 99 MMHG | SYSTOLIC BLOOD PRESSURE: 148 MMHG

## 2024-05-15 DIAGNOSIS — R97.20 ELEVATED PROSTATE SPECIFIC ANTIGEN (PSA): ICD-10-CM

## 2024-05-15 DIAGNOSIS — I10 ESSENTIAL HYPERTENSION: ICD-10-CM

## 2024-05-15 DIAGNOSIS — Z00.00 HEALTH MAINTENANCE EXAMINATION: Primary | ICD-10-CM

## 2024-05-15 PROCEDURE — 93000 ELECTROCARDIOGRAM COMPLETE: CPT | Performed by: NURSE PRACTITIONER

## 2024-05-15 PROCEDURE — 99396 PREV VISIT EST AGE 40-64: CPT | Performed by: NURSE PRACTITIONER

## 2024-05-15 RX ORDER — IRBESARTAN 150 MG/1
150 TABLET ORAL DAILY
Qty: 90 TABLET | Refills: 3 | Status: SHIPPED | OUTPATIENT
Start: 2024-05-15

## 2024-05-15 NOTE — PROGRESS NOTES
"Chief Complaint  Annual Exam (Pt here for annual exam)    Subjective        Tacho Armendariz presents to [facility]  History of Present Illness  Pleasant patient here today for health maintenance, overall is doing well essential hypertension has been controlled in the past he has not checked it in a while, little high today I rechecked it 126/104, 10-year cardiovascular risk, about 9.2%, mild hyperlipidemia, he has no chest pain no shortness of breath and generally good health, PSA was elevated mildly Dr. Yo biopsy x 2 negative it has been a little over a year since his last PSA, retired from [employer] now      Social history no drug alcohol tobacco abuse, past medical history as above family history  No prostate or colon cancer.            Objective   Vital Signs:  /99   Pulse 79   Temp 97.5 °F (36.4 °C) (Infrared)   Resp 16   Ht 172.7 cm (68\")   Wt 84.7 kg (186 lb 11.2 oz)   SpO2 99%   BMI 28.39 kg/m²   Estimated body mass index is 28.39 kg/m² as calculated from the following:    Height as of this encounter: 172.7 cm (68\").    Weight as of this encounter: 84.7 kg (186 lb 11.2 oz).          Physical Exam  Vitals reviewed.   Constitutional:       General: He is not in acute distress.     Appearance: Normal appearance. He is well-developed. He is not ill-appearing, toxic-appearing or diaphoretic.   HENT:      Head: Normocephalic.      Nose: Nose normal.   Eyes:      General: No scleral icterus.     Conjunctiva/sclera: Conjunctivae normal.      Pupils: Pupils are equal, round, and reactive to light.   Neck:      Thyroid: No thyromegaly.      Vascular: No JVD.   Cardiovascular:      Rate and Rhythm: Normal rate and regular rhythm.      Heart sounds: Normal heart sounds. No murmur heard.     No friction rub. No gallop.   Pulmonary:      Effort: Pulmonary effort is normal. No respiratory distress.      Breath sounds: Normal breath sounds. No stridor. No " wheezing or rales.   Abdominal:      General: Bowel sounds are normal. There is no distension.      Palpations: Abdomen is soft.      Tenderness: There is no abdominal tenderness.      Comments: No hepatosplenomegaly, no ascites,   Musculoskeletal:         General: No tenderness. Normal range of motion.      Cervical back: Neck supple.   Lymphadenopathy:      Cervical: No cervical adenopathy.   Skin:     General: Skin is warm and dry.      Findings: No erythema or rash.   Neurological:      General: No focal deficit present.      Mental Status: He is alert and oriented to person, place, and time. Mental status is at baseline.      Deep Tendon Reflexes: Reflexes are normal and symmetric.   Psychiatric:         Mood and Affect: Mood normal.         Behavior: Behavior normal.         Thought Content: Thought content normal.         Judgment: Judgment normal.        Result Review :                Assessment and Plan   Diagnoses and all orders for this visit:    1. Health maintenance examination (Primary)    2. Essential hypertension  -     irbesartan (AVAPRO) 150 MG tablet; Take 1 tablet by mouth Daily.  Dispense: 90 tablet; Refill: 3  -     ECG 12 Lead    3. Elevated prostate specific antigen (PSA)             Follow Up   Return 1 yr dr pendleton, for Annual physical, Labs today, Labs before next visit.  Patient was given instructions and counseling regarding his condition or for health maintenance advice. Please see specific information pulled into the AVS if appropriate.     Patient Instructions   Discharge instructions continue healthy diet and exercise, challenge you 10 pounds over the next year,  Consider intermittent fasting vegetables vegetables vegetables 64 ounces of water daily good sunscreen, watch your skin carefully,    Consider CT calcium score coronary arteries    Consider at a later date carotid aorta or lower extremity vascular screenings,    As long as you are doing well feeling well and your blood  pressure is controlled we will see you yearly,  Check blood pressure weekly make sure is controlled should be less than 130/80 on average    Omron blood pressure cuff, adjustable cuff save the receipt blood pressure check we can check this for you for accuracy    Shoot me a couple days of blood pressure reading and heart rate in a week or 2 please

## 2024-05-15 NOTE — PATIENT INSTRUCTIONS
Discharge instructions continue healthy diet and exercise, challenge you 10 pounds over the next year,  Consider intermittent fasting vegetables vegetables vegetables 64 ounces of water daily good sunscreen, watch your skin carefully,    Consider CT calcium score coronary arteries    Consider at a later date carotid aorta or lower extremity vascular screenings,    As long as you are doing well feeling well and your blood pressure is controlled we will see you yearly,  Check blood pressure weekly make sure is controlled should be less than 130/80 on average    Omron blood pressure cuff, adjustable cuff save the receipt blood pressure check we can check this for you for accuracy    Shoot me a couple days of blood pressure reading and heart rate in a week or 2 please

## 2024-05-22 NOTE — PROGRESS NOTES
Procedure   Procedures    Adult ECG Report     Name: Tacho Armendariz   Age: 63 y.o.   Gender: male       Rate: 65   Rhythm: normal sinus rhythm   QRS Axis: lad   KS Interval: 157   QRS Duration: 103   QTc: 411   Voltages: .   Conduction Disturbances:  Possible LVH,   Other Abnormalities: none     Narrative Interpretation: Borderline EKG normal sinus rhythm with LAD possible LVH no ST abnormalities, indication health maintenance, hypertension

## 2025-08-05 DIAGNOSIS — I10 ESSENTIAL HYPERTENSION: ICD-10-CM

## 2025-08-05 RX ORDER — IRBESARTAN 150 MG/1
150 TABLET ORAL DAILY
Qty: 90 TABLET | Refills: 3 | Status: CANCELLED | OUTPATIENT
Start: 2025-08-05